# Patient Record
Sex: MALE | Race: WHITE | NOT HISPANIC OR LATINO | Employment: OTHER | URBAN - METROPOLITAN AREA
[De-identification: names, ages, dates, MRNs, and addresses within clinical notes are randomized per-mention and may not be internally consistent; named-entity substitution may affect disease eponyms.]

---

## 2017-08-23 ENCOUNTER — OFFICE VISIT (OUTPATIENT)
Dept: CARDIOLOGY | Facility: CLINIC | Age: 62
End: 2017-08-23

## 2017-08-23 VITALS
SYSTOLIC BLOOD PRESSURE: 110 MMHG | BODY MASS INDEX: 21.19 KG/M2 | DIASTOLIC BLOOD PRESSURE: 74 MMHG | HEART RATE: 88 BPM | WEIGHT: 148 LBS | HEIGHT: 70 IN

## 2017-08-23 DIAGNOSIS — E78.2 MIXED HYPERLIPIDEMIA: ICD-10-CM

## 2017-08-23 DIAGNOSIS — I48.0 PAROXYSMAL ATRIAL FIBRILLATION (HCC): Primary | ICD-10-CM

## 2017-08-23 DIAGNOSIS — I10 ESSENTIAL HYPERTENSION: ICD-10-CM

## 2017-08-23 PROCEDURE — 99214 OFFICE O/P EST MOD 30 MIN: CPT | Performed by: INTERNAL MEDICINE

## 2017-08-23 RX ORDER — LOSARTAN POTASSIUM 100 MG/1
100 TABLET ORAL DAILY
COMMUNITY
End: 2023-02-22

## 2017-08-23 RX ORDER — ATORVASTATIN CALCIUM 80 MG/1
80 TABLET, FILM COATED ORAL DAILY
COMMUNITY
End: 2017-08-23

## 2017-08-23 RX ORDER — RANITIDINE 300 MG/1
300 CAPSULE ORAL 2 TIMES DAILY
COMMUNITY
End: 2023-02-22

## 2017-08-23 RX ORDER — ATORVASTATIN CALCIUM 20 MG/1
20 TABLET, FILM COATED ORAL NIGHTLY
COMMUNITY
End: 2023-02-22

## 2017-08-23 RX ORDER — RANITIDINE 150 MG/1
150 TABLET ORAL 2 TIMES DAILY
COMMUNITY
End: 2017-08-23

## 2017-08-23 RX ORDER — ERGOCALCIFEROL 1.25 MG/1
50000 CAPSULE ORAL WEEKLY
COMMUNITY

## 2017-08-23 RX ORDER — HYDROXYZINE PAMOATE 50 MG/1
50 CAPSULE ORAL NIGHTLY
COMMUNITY
End: 2023-02-22

## 2017-08-23 NOTE — PROGRESS NOTES
"[facility]  Office Progress Note  Blanco Alicea  1955  [phone]      Visit Date: 08/23/2017    PCP: Memo Grimm, APRN  [address] [address]    IDENTIFICATION: A 62 y.o. male retired     Chief Complaint   Patient presents with   • Atrial Fibrillation       PROBLEM LIST:   1. New-onset atrial fibrillation 2015  11/15 echo mild + LVH EF 60%  2. Hypertension  3. Hyperlipidemia  1. 8/16   4. Seasonal allergies  5. GERD  6. R ankle pain-brace 2017    Allergies  Allergies   Allergen Reactions   • Penicillins        Current Medications    Current Outpatient Prescriptions:   •  apixaban (ELIQUIS) 5 MG tablet tablet, Take 5 mg by mouth Every 12 (Twelve) Hours., Disp: , Rfl:   •  atorvastatin (LIPITOR) 20 MG tablet, Take 20 mg by mouth Every Night., Disp: , Rfl:   •  carvedilol (COREG) 6.25 MG tablet, Take 6.25 mg by mouth 2 (Two) Times a Day., Disp: , Rfl:   •  hydrOXYzine (VISTARIL) 50 MG capsule, Take 50 mg by mouth Every Night., Disp: , Rfl:   •  losartan (COZAAR) 100 MG tablet, Take 100 mg by mouth Daily., Disp: , Rfl:   •  ranitidine (ZANTAC) 300 MG capsule, Take 300 mg by mouth 2 (Two) Times a Day., Disp: , Rfl:   •  vitamin D (ERGOCALCIFEROL) 02991 units capsule capsule, Take 50,000 Units by mouth 1 (One) Time Per Week., Disp: , Rfl:       History of Present Illness     Pt denies any chest pain, dyspnea, dyspnea on exertion, orthopnea, PND, palpitations, lower extremity edema, or claudication.  Notes daytime drowsiness and \"moaning\" at night while asleep.  Only  2 episodes of palpitations since last visit.    ROS:  All systems have been reviewed and are negative with the exception of those mentioned in the HPI.    OBJECTIVE:  Vitals:    08/23/17 1139   BP: 110/74   BP Location: Right arm   Patient Position: Sitting   Pulse: 88   Weight: 148 lb (67.1 kg)   Height: 70\" (177.8 cm)     Physical Exam   Constitutional: He appears " well-developed and well-nourished.   Neck: Normal range of motion. Neck supple. No hepatojugular reflux and no JVD present. Carotid bruit is not present. No tracheal deviation present. No thyromegaly present.   Cardiovascular: Normal rate, regular rhythm, S1 normal, S2 normal, intact distal pulses and normal pulses.  PMI is not displaced.  Exam reveals no gallop, no distant heart sounds, no friction rub, no midsystolic click and no opening snap.    No murmur heard.  Pulses:       Radial pulses are 2+ on the right side, and 2+ on the left side.        Dorsalis pedis pulses are 2+ on the right side, and 2+ on the left side.        Posterior tibial pulses are 2+ on the right side, and 2+ on the left side.   Pulmonary/Chest: Effort normal and breath sounds normal. He has no wheezes. He has no rales.   Abdominal: Soft. Bowel sounds are normal. He exhibits no mass. There is no tenderness. There is no guarding.   Musculoskeletal:   R ankle w pitting edema       Diagnostic Data:  Procedures      ASSESSMENT:   Diagnosis Plan   1. Paroxysmal atrial fibrillation     2. Essential hypertension     3. Mixed hyperlipidemia         PLAN:  1. On Eliquis, coreg , will screen w nocturnal O2 sat  2. Well controlled today, continue antihypertensives  3. Labs per PCP, continue statin therapy with atorvastatin     BENITO Tejada, thank you for referring Mr. Alicea for evaluation.  I have forwarded my electronically generated recommendations to you for review.  Please do not hesitate to call with any questions.    Scribed for Grady Hamilton MD by Zohreh Ng PA-C. 8/23/2017  11:54 AM  I, Grady Hamilton MD, personally performed the services described in this documentation as scribed by the above named individual in my presence, and it is both accurate and complete.  8/23/2017  12:48 PM    Grady Hamilton MD, [facility]

## 2017-09-05 RX ORDER — APIXABAN 5 MG/1
TABLET, FILM COATED ORAL
Qty: 30 TABLET | Refills: 11 | Status: SHIPPED | OUTPATIENT
Start: 2017-09-05 | End: 2018-03-09 | Stop reason: SDUPTHER

## 2020-06-02 RX ORDER — APIXABAN 5 MG/1
TABLET, FILM COATED ORAL
Qty: 180 TABLET | Refills: 3 | OUTPATIENT
Start: 2020-06-02

## 2020-11-19 ENCOUNTER — CONSULT (OUTPATIENT)
Dept: CARDIOLOGY | Facility: CLINIC | Age: 65
End: 2020-11-19

## 2020-11-19 VITALS
HEIGHT: 70 IN | BODY MASS INDEX: 22.33 KG/M2 | DIASTOLIC BLOOD PRESSURE: 80 MMHG | WEIGHT: 156 LBS | HEART RATE: 93 BPM | TEMPERATURE: 98.2 F | SYSTOLIC BLOOD PRESSURE: 130 MMHG

## 2020-11-19 DIAGNOSIS — I48.0 PAROXYSMAL ATRIAL FIBRILLATION (HCC): Primary | ICD-10-CM

## 2020-11-19 DIAGNOSIS — E78.2 MIXED HYPERLIPIDEMIA: ICD-10-CM

## 2020-11-19 DIAGNOSIS — I10 ESSENTIAL HYPERTENSION: ICD-10-CM

## 2020-11-19 PROCEDURE — 93000 ELECTROCARDIOGRAM COMPLETE: CPT | Performed by: INTERNAL MEDICINE

## 2020-11-19 PROCEDURE — 99204 OFFICE O/P NEW MOD 45 MIN: CPT | Performed by: INTERNAL MEDICINE

## 2020-11-19 RX ORDER — ROSUVASTATIN CALCIUM 20 MG/1
20 TABLET, COATED ORAL NIGHTLY
COMMUNITY
Start: 2020-10-04 | End: 2023-02-22

## 2020-11-19 NOTE — PROGRESS NOTES
[facility]  Consultation H&P  Blanco Alicea  1955  [address]  [facility] [address]     VISIT DATE:  11/19/20    PCP: Memo Grimm, APRN  [address]  [facility] [address]    IDENTIFICATION: A 65 y.o.  male  retired       PROBLEM LIST:   1. PAF  1. [id number] 2, on Eliquis  2. Onset 2015  3. 11/15 echo mild + LVH EF 60%  2. Hypertension  3. Hyperlipidemia, on atorvastatin   1. 8/16   2. 11/16/2020   HDL 69 LDL 98  4. Renal insufficiency  1. 11/2020 Cr 1.6, reports new nsaid use  5. Seasonal allergies  6. GERD  7. R ankle pain-brace 2017  8. Surgical history: Tonsillectomy, ankle fusion 2018    CC:  Chief Complaint   Patient presents with   • Atrial Fibrillation       Allergies  Allergies   Allergen Reactions   • Penicillins        Current Medications    Current Outpatient Medications:   •  apixaban (Eliquis) 5 MG tablet tablet, Take 1 tablet by mouth Every 12 (Twelve) Hours., Disp: 180 tablet, Rfl: 3  •  carvedilol (COREG) 6.25 MG tablet, Take 6.25 mg by mouth 2 (Two) Times a Day., Disp: , Rfl:   •  atorvastatin (LIPITOR) 20 MG tablet, Take 20 mg by mouth Every Night., Disp: , Rfl:   •  esomeprazole (nexIUM) 20 MG capsule, , Disp: , Rfl:   •  hydrOXYzine (VISTARIL) 50 MG capsule, Take 50 mg by mouth Every Night., Disp: , Rfl:   •  losartan (COZAAR) 100 MG tablet, Take 100 mg by mouth Daily., Disp: , Rfl:   •  ranitidine (ZANTAC) 300 MG capsule, Take 300 mg by mouth 2 (Two) Times a Day., Disp: , Rfl:   •  rosuvastatin (CRESTOR) 20 MG tablet, Take 20 mg by mouth Every Night., Disp: , Rfl:   •  vitamin D (ERGOCALCIFEROL) 97972 units capsule capsule, Take 50,000 Units by mouth 1 (One) Time Per Week., Disp: , Rfl:      History of Present Illness   HPI  Blanco Alicea is a 65 y.o. year old male with the above mentioned PMH who presents for follow up after a 3 year hiatus from our practice.      Pt denies any chest pain, dyspnea at  "rest, dyspnea on exertion, orthopnea, PND, palpitations, lower extremity edema, or claudication. Minimal exercise, has back trouble, long time rupture disc, and has now developed sciatica, being evaled by [facility] next month. Was historically symptomatic w afib, has not had symptoms in over a year.  Pt denies history of CHF, DVT, PE, MI, CVA, TIA, or rheumatic fever. Does note he has been taking advil. On statin. BP controlled today, though he does not check at home.    ROS  Review of Systems   HENT: Positive for hearing loss.    Respiratory: Positive for snoring.    All other systems reviewed and are negative.      SOCIAL HX  Social History     Socioeconomic History   • Marital status:      Spouse name: Not on file   • Number of children: Not on file   • Years of education: Not on file   • Highest education level: Not on file   Tobacco Use   • Smoking status: Former Smoker   • Smokeless tobacco: Never Used   Substance and Sexual Activity   • Alcohol use: Yes     Comment: rare   • Drug use: No       FAMILY HX  Family History   Problem Relation Age of Onset   • Stroke Mother    • Stroke Father        Vitals:    11/19/20 1256   BP: 130/80   BP Location: Right arm   Patient Position: Sitting   Pulse: 93   Temp: 98.2 °F (36.8 °C)   Weight: 70.8 kg (156 lb)   Height: 177.8 cm (70\")       PHYSICAL EXAMINATION:  Vitals signs and nursing note reviewed.   Constitutional:       General: Not in acute distress.     Appearance: Well-developed and not in distress.   Eyes:      Conjunctiva/sclera: Conjunctivae normal.   HENT:      Head: Normocephalic and atraumatic.      Right Ear: External ear normal.      Left Ear: External ear normal.      Nose: Nose normal.   Neck:      Musculoskeletal: Neck supple.      Thyroid: No thyromegaly.      Vascular: No carotid bruit or JVD.   Pulmonary:      Effort: Pulmonary effort is normal. No respiratory distress.      Breath sounds: Normal breath sounds. No decreased breath sounds. No " wheezing. No rhonchi. No rales.   Cardiovascular:      Normal rate. Regular rhythm. Normal S1. Normal S2.      Murmurs: There is no murmur.      No gallop. No click. No rub.   Pulses:     No decreased pulses.   Abdominal:      General: Bowel sounds are normal.      Palpations: Abdomen is soft.      Tenderness: There is no abdominal tenderness.   Musculoskeletal: Normal range of motion.   Skin:     General: Skin is warm and dry.      Findings: No erythema.   Neurological:      Mental Status: Alert and oriented to person, place, and time.      Comments: No focal deficits.   Psychiatric:         Thought Content: Thought content normal.         Diagnostic Data:    ECG 12 Lead    Date/Time: 11/19/2020 1:01 PM  Performed by: Grady Hamilton MD  Authorized by: Grady Hamilton MD   Comparison: not compared with previous ECG   Previous ECG: no previous ECG available  Rhythm: sinus rhythm  BPM: 93            No results found for: CHLPL, TRIG, HDL, LDLDIRECT  No results found for: GLUCOSE, BUN, CREATININE, NA, K, CL, CO2, CREATININE, BUN  No results found for: HGBA1C  No results found for: WBC, HGB, HCT, PLT    ASSESSMENT:   Diagnosis Plan   1. Paroxysmal atrial fibrillation (CMS/HCC)  ECG 12 Lead    apixaban (Eliquis) 5 MG tablet tablet   2. Essential hypertension     3. Mixed hyperlipidemia         PLAN:  1. NSR today.  Continue Coreg, Eliquis.  Eliquis [facility] sent [facility] [place]  2. Patient has acceptable BP. Continue current medical management. Counseled to regularly check BP at home with goal averaging <130/80.   3. Continue statin therapy.   4. Noted patient's creatinine 1.6, has not had labs reviewed with PCP yet.  Counseled patient to wean off Advil and try taking turmeric for back pain/inflammation.,  Counseled he may also take Tylenol.  Avoid NSAIDs for renal/cardiovascular risk.        BENITO Tejada, thank you for referring Mr. Alicea for evaluation.  I have forwarded my electronically generated  recommendations to you for review.  Please do not hesitate to call with any questions.    Scribed for Grady Hamilton MD by Zohreh Ng PA-C. 11/19/2020  13:26 EST   Grady Hamilton MD, FACC

## 2021-05-11 ENCOUNTER — TELEPHONE (OUTPATIENT)
Dept: CARDIOLOGY | Facility: CLINIC | Age: 66
End: 2021-05-11

## 2021-05-11 NOTE — TELEPHONE ENCOUNTER
Pt is scheduled to have ankle replacement surgery with  at [facility]. Pt last seen on 11/19/20. Please advise and a letter will be sent

## 2023-02-06 ENCOUNTER — TELEPHONE (OUTPATIENT)
Dept: CARDIOLOGY | Facility: CLINIC | Age: 68
End: 2023-02-06
Payer: MEDICARE

## 2023-02-22 ENCOUNTER — OFFICE VISIT (OUTPATIENT)
Dept: CARDIOLOGY | Facility: CLINIC | Age: 68
End: 2023-02-22
Payer: MEDICARE

## 2023-02-22 VITALS
DIASTOLIC BLOOD PRESSURE: 62 MMHG | WEIGHT: 160 LBS | HEART RATE: 108 BPM | SYSTOLIC BLOOD PRESSURE: 118 MMHG | OXYGEN SATURATION: 98 % | HEIGHT: 70 IN | BODY MASS INDEX: 22.9 KG/M2

## 2023-02-22 DIAGNOSIS — I48.0 PAROXYSMAL ATRIAL FIBRILLATION: ICD-10-CM

## 2023-02-22 PROCEDURE — 93000 ELECTROCARDIOGRAM COMPLETE: CPT | Performed by: INTERNAL MEDICINE

## 2023-02-22 PROCEDURE — 99213 OFFICE O/P EST LOW 20 MIN: CPT | Performed by: INTERNAL MEDICINE

## 2023-02-22 RX ORDER — CARVEDILOL 6.25 MG/1
6.25 TABLET ORAL 2 TIMES DAILY
Qty: 60 TABLET | Refills: 0 | Status: SHIPPED | OUTPATIENT
Start: 2023-02-22 | End: 2023-03-20 | Stop reason: SDUPTHER

## 2023-02-22 RX ORDER — ROSUVASTATIN CALCIUM 20 MG/1
20 TABLET, COATED ORAL NIGHTLY
Qty: 90 TABLET | Refills: 3 | Status: SHIPPED | OUTPATIENT
Start: 2023-02-22

## 2023-02-22 RX ORDER — LOSARTAN POTASSIUM 100 MG/1
100 TABLET ORAL DAILY
Qty: 90 TABLET | Refills: 3 | Status: SHIPPED | OUTPATIENT
Start: 2023-02-22

## 2023-02-22 NOTE — PROGRESS NOTES
"[facility]  Consultation H&P  Blanco Alicea  1955  [address]  [facility] [address]     VISIT DATE:  02/22/23    PCP: Memo Grimm, APRN  [address]  [facility] [address]    IDENTIFICATION: A 67 y.o.  male  retired       PROBLEM LIST:   1. PAF  1. [id number] 2, on Eliquis  2. Onset 2015  3. 11/15 echo mild + LVH EF 60%  2. Hypertension  3. Hyperlipidemia, on atorvastatin   1. 8/16   2. 11/16/2020   HDL 69 LDL 98  4. Renal insufficiency  1. 11/2020 Cr 1.6, reports new nsaid use  5. Seasonal allergies  6. GERD  7. ankle pain-brace 2017  8. Surgical history: Tonsillectomy, L ankle fusion X3  [id number]    CC:  Chief Complaint   Patient presents with   • Re_Eval     New Patient       Allergies  Allergies   Allergen Reactions   • Penicillins Other (See Comments)     Childhood         Current Medications    Current Outpatient Medications:   •  apixaban (Eliquis) 5 MG tablet tablet, Take 1 tablet by mouth Every 12 (Twelve) Hours., Disp: 180 tablet, Rfl: 3  •  carvedilol (COREG) 6.25 MG tablet, Take 1 tablet by mouth 2 (Two) Times a Day., Disp: 60 tablet, Rfl: 0  •  esomeprazole (nexIUM) 20 MG capsule, 20 mg Daily., Disp: , Rfl:   •  losartan (COZAAR) 100 MG tablet, Take 1 tablet by mouth Daily., Disp: 90 tablet, Rfl: 3  •  rosuvastatin (CRESTOR) 20 MG tablet, Take 1 tablet by mouth Every Night., Disp: 90 tablet, Rfl: 3  •  vitamin D (ERGOCALCIFEROL) 77934 units capsule capsule, Take 50,000 Units by mouth 1 (One) Time Per Week., Disp: , Rfl:      History of Present Illness   HPI  Blanco Alicea is a 67 y.o. year old male with the above mentioned PMH who presents for follow up after a 3 year hiatus from our practice(again).    He has had 3 ankle surgeries since last visit walking with a cane and is less physically active.  He has been out of his cardiac medications for \"some time\"    Vitals:    02/22/23 0856   BP: 118/62   BP " "Location: Right arm   Patient Position: Sitting   Pulse: 108   SpO2: 98%   Weight: 72.6 kg (160 lb)   Height: 177.8 cm (70\")       PHYSICAL EXAMINATION:  Vitals and nursing note reviewed.   Constitutional:       General: Not in acute distress.     Appearance: Well-developed and not in distress.   Eyes:      Conjunctiva/sclera: Conjunctivae normal.   HENT:      Head: Normocephalic and atraumatic.      Right Ear: External ear normal.      Left Ear: External ear normal.      Nose: Nose normal.   Neck:      Thyroid: No thyromegaly.      Vascular: No carotid bruit or JVD.   Pulmonary:      Effort: Pulmonary effort is normal. No respiratory distress.      Breath sounds: Normal breath sounds. No decreased breath sounds. No wheezing. No rhonchi. No rales.   Cardiovascular:      Tachycardia present. Regular rhythm. Normal S1. Normal S2.      Murmurs: There is no murmur.      No gallop. No click. No rub.   Pulses:     No decreased pulses.   Abdominal:      General: Bowel sounds are normal.      Palpations: Abdomen is soft.      Tenderness: There is no abdominal tenderness.   Musculoskeletal: Normal range of motion.      Cervical back: Neck supple. Skin:     General: Skin is warm and dry.      Findings: No erythema.   Neurological:      Mental Status: Alert and oriented to person, place, and time.      Comments: No focal deficits.   Psychiatric:         Thought Content: Thought content normal.         Diagnostic Data:    ECG 12 Lead    Date/Time: 2/22/2023 9:18 AM  Performed by: Grady Hamilton MD  Authorized by: Grady Hamilton MD   Comparison: compared with previous ECG from 11/19/2020  Similar to previous ECG  Rhythm: sinus tachycardia  BPM: 105    Clinical impression: non-specific ECG            ASSESSMENT:   Diagnosis Plan   1. Paroxysmal atrial fibrillation (HCC)  apixaban (Eliquis) 5 MG tablet tablet          PLAN:  1. PAF.  Continue Coreg, Eliquis.  Eliquis prescription sent to [facility].  I reiterated need to " be compliant with medications and probability that he ever have recurrent atrial fibrillation if he abruptly stops beta-blockade.  2. Patient has acceptable BP. Continue current medical management. Counseled to regularly check BP at home with goal averaging <130/80.   3. Continue statin therapy.         No ref. provider found, thank you for referring Mr. Alicea for evaluation.  I have forwarded my electronically generated recommendations to you for review.  Please do not hesitate to call with any questions.     Grady Hamilton MD  2/22/2023  09:18 EST   Grady Hamilton MD, [facility]

## 2023-03-20 RX ORDER — CARVEDILOL 6.25 MG/1
6.25 TABLET ORAL 2 TIMES DAILY
Qty: 180 TABLET | Refills: 3 | Status: SHIPPED | OUTPATIENT
Start: 2023-04-20

## 2023-03-20 RX ORDER — CARVEDILOL 6.25 MG/1
6.25 TABLET ORAL 2 TIMES DAILY
Qty: 60 TABLET | Refills: 0 | Status: SHIPPED | OUTPATIENT
Start: 2023-03-20 | End: 2023-03-20 | Stop reason: SDUPTHER

## 2023-05-01 RX ORDER — CARVEDILOL 6.25 MG/1
TABLET ORAL
Qty: 60 TABLET | OUTPATIENT
Start: 2023-05-01

## 2023-05-01 NOTE — TELEPHONE ENCOUNTER
Called to the room by pt rt bug crawling up his arm. Believed to be bed bugs as spotted by x ray previously. Specimen collected & provided pt wipe to clean his hands per request.  Pt would now like to leave AMA. Told him that I would discuss that with the admitting doctor. Hospitalist messaged; awaiting response.        Catherine Fuentes RN  09/16/20 6140 Sent to mail order 3/2023

## 2023-12-12 ENCOUNTER — TELEPHONE (OUTPATIENT)
Dept: CARDIOLOGY | Facility: CLINIC | Age: 68
End: 2023-12-12
Payer: MEDICARE

## 2023-12-12 NOTE — TELEPHONE ENCOUNTER
Caller: Blanco Alicea    Relationship to patient: Self    Best call back number: [phone]    Chief complaint: AFBIB    Type of visit: FU    Requested date: PT WILL CAN BE SCHEDULED AT ANY LOCATION

## 2024-01-10 ENCOUNTER — OFFICE VISIT (OUTPATIENT)
Dept: CARDIOLOGY | Facility: CLINIC | Age: 69
End: 2024-01-10
Payer: MEDICARE

## 2024-01-10 VITALS
BODY MASS INDEX: 23.48 KG/M2 | HEART RATE: 83 BPM | WEIGHT: 164 LBS | OXYGEN SATURATION: 97 % | DIASTOLIC BLOOD PRESSURE: 72 MMHG | SYSTOLIC BLOOD PRESSURE: 126 MMHG | HEIGHT: 70 IN

## 2024-01-10 DIAGNOSIS — E78.2 MIXED HYPERLIPIDEMIA: ICD-10-CM

## 2024-01-10 DIAGNOSIS — I48.0 PAROXYSMAL ATRIAL FIBRILLATION: Primary | ICD-10-CM

## 2024-01-10 DIAGNOSIS — I10 PRIMARY HYPERTENSION: ICD-10-CM

## 2024-01-10 PROCEDURE — 3078F DIAST BP <80 MM HG: CPT | Performed by: INTERNAL MEDICINE

## 2024-01-10 PROCEDURE — 93000 ELECTROCARDIOGRAM COMPLETE: CPT | Performed by: INTERNAL MEDICINE

## 2024-01-10 PROCEDURE — 99214 OFFICE O/P EST MOD 30 MIN: CPT | Performed by: INTERNAL MEDICINE

## 2024-01-10 PROCEDURE — 3074F SYST BP LT 130 MM HG: CPT | Performed by: INTERNAL MEDICINE

## 2024-01-10 RX ORDER — KETOCONAZOLE 20 MG/G
CREAM TOPICAL
COMMUNITY
Start: 2023-11-28

## 2024-01-10 NOTE — PROGRESS NOTES
[facility] Cardiology  Consultation H&P  Blanco Alicea  1955  [address]  [facility] [address]     VISIT DATE:  01/10/24    PCP: Memo Grimm, APRN  [address]  [facility] [address]    IDENTIFICATION: A 68 y.o.  male  retired       PROBLEM LIST:   PAF  [id number] 2, on Eliquis  Onset 2015  11/15 echo mild + LVH EF 60%  Hypertension  Hyperlipidemia, on atorvastatin   8/16   11/16/2020   HDL 69 LDL 98  Renal insufficiency  11/2020 Cr 1.6, reports new nsaid use  Seasonal allergies  GERD  ankle pain-brace 2017  Surgical history: Tonsillectomy, L ankle fusion X3  [id number]    CC:  Chief Complaint   Patient presents with    PAF       Allergies  Allergies   Allergen Reactions    Penicillins Other (See Comments)     Childhood         Current Medications    Current Outpatient Medications:     apixaban (Eliquis) 5 MG tablet tablet, Take 1 tablet by mouth Every 12 (Twelve) Hours., Disp: 180 tablet, Rfl: 3    carvedilol (COREG) 6.25 MG tablet, Take 1 tablet by mouth 2 (Two) Times a Day., Disp: 180 tablet, Rfl: 3    esomeprazole (nexIUM) 20 MG capsule, 1 capsule Daily., Disp: , Rfl:     ketoconazole (NIZORAL) 2 % cream, APPLY 1 GRAM TOPICALLY TO THE AFFECTED AREA DAILY, Disp: , Rfl:     losartan (COZAAR) 100 MG tablet, Take 1 tablet by mouth Daily., Disp: 90 tablet, Rfl: 3    rosuvastatin (CRESTOR) 20 MG tablet, Take 1 tablet by mouth Every Night., Disp: 90 tablet, Rfl: 3    vitamin D (ERGOCALCIFEROL) 83128 units capsule capsule, Take 1 capsule by mouth 1 (One) Time Per Week., Disp: , Rfl:      History of Present Illness   HPI  Blanco Alicea is a 68 y.o. year old male with the above mentioned PMH who presents for follow up .    Accompanied by his wife, states he has had recurrence of what he thinks is atrial fibrillation according to his smart watch.  This can occur spontaneously every other day with no rhyme or reason.  He states it resolved  "spontaneously.  He is not exercising at all.  He does drink at least 2 glasses of wine nightly    Vitals:    01/10/24 0957   BP: 126/72   BP Location: Right arm   Patient Position: Sitting   Pulse: 83   SpO2: 97%   Weight: 74.4 kg (164 lb)   Height: 177.8 cm (70\")       PHYSICAL EXAMINATION:  Vitals and nursing note reviewed.   Constitutional:       General: Not in acute distress.     Appearance: Well-developed and not in distress.   Eyes:      Conjunctiva/sclera: Conjunctivae normal.   HENT:      Head: Normocephalic and atraumatic.      Right Ear: External ear normal.      Left Ear: External ear normal.      Nose: Nose normal.   Neck:      Thyroid: No thyromegaly.      Vascular: No carotid bruit or JVD.   Pulmonary:      Effort: Pulmonary effort is normal. No respiratory distress.      Breath sounds: Normal breath sounds. No decreased breath sounds. No wheezing. No rhonchi. No rales.   Cardiovascular:      Normal rate. Regular rhythm. Normal S1. Normal S2.       Murmurs: There is no murmur.      No gallop.  No click. No rub.   Pulses:     No decreased pulses.   Abdominal:      General: Bowel sounds are normal.      Palpations: Abdomen is soft.      Tenderness: There is no abdominal tenderness.   Musculoskeletal: Normal range of motion.      Cervical back: Neck supple. Skin:     General: Skin is warm and dry.      Findings: No erythema.   Neurological:      Mental Status: Alert and oriented to person, place, and time.      Comments: No focal deficits.   Psychiatric:         Thought Content: Thought content normal.         Diagnostic Data:    ECG 12 Lead    Date/Time: 1/10/2024 10:19 AM  Performed by: Grady Hamilton MD    Authorized by: Grady Hamilton MD  Comparison: compared with previous ECG from 2/22/2023  Similar to previous ECG  Rhythm: sinus rhythm  BPM: 77    Clinical impression: normal ECG          ASSESSMENT:   Diagnosis Plan   1. Paroxysmal atrial fibrillation        2. Primary hypertension        3. " Mixed hyperlipidemia              PLAN:  PAF.  Continue Coreg, Eliquis.  Eliquis prescription sent to [facility].  Will document 1 week E patch with potential transition of pharmacotherapy including antiarrhythmic  Patient has acceptable BP. Continue current medical management. Counseled to regularly check BP at home with goal averaging <130/80.   Controlled on statin therapy.         No ref. provider found, thank you for referring Mr. Alicea for evaluation.  I have forwarded my electronically generated recommendations to you for review.  Please do not hesitate to call with any questions.     Grady Hamilton MD  1/10/2024  10:20 EST   Grady Hamilton MD, FACC

## 2024-01-25 RX ORDER — LOSARTAN POTASSIUM 100 MG/1
100 TABLET ORAL DAILY
Qty: 90 TABLET | Refills: 3 | Status: SHIPPED | OUTPATIENT
Start: 2024-01-25

## 2024-02-09 ENCOUNTER — TELEPHONE (OUTPATIENT)
Dept: CARDIOLOGY | Facility: CLINIC | Age: 69
End: 2024-02-09
Payer: MEDICARE

## 2024-02-09 NOTE — TELEPHONE ENCOUNTER
Patient notified and verbalized understanding. Current BP is 78/61 and his is hydrating. Advised when to proceed to the ER.

## 2024-02-09 NOTE — TELEPHONE ENCOUNTER
"Patient states that he has been in afib yesterday and today. He wears a watch that shows he is out of rhythm. Current vitals: BP 65/45, HR 72    He has doubled his carvedilol yesterday and this morning. He normally takes carvedilol 6.25mg BID. The patient also has losartan 100 nightly for BP. He takes eliquis 5mg BID daily.    Denies CP, SOA, edema. States he is \"a little dizzy and light-headed\". The patient drinks one cup of coffee daily, one glass of wine, and makes sure to drink fluids. He has no sleep issues and does not smoke. I advised of potential triggers and he will not have any caffeine or alcohol today.    I advised the drink a sports drink and eat some salty food to help get his BP up. He will be careful moving from sitting to standing. The lower BP rates could be due to the increase in carvedilol over the last two days as well as being out of rhythm. I will speak with the provider for further recs. The patient was informed when they should proceed to the ED.  "

## 2024-02-09 NOTE — TELEPHONE ENCOUNTER
Caller: Sissy Alicea    Relationship: Emergency Contact    Best call back number: [phone]    What is the best time to reach you: ANY    Who are you requesting to speak with (clinical staff, provider,  specific staff member): ANY      What was the call regarding: PT WAS IN A FIB YESTERDAY AND TODAY. WOULD LIKE TO SPEAK TO RN OR MA    Is it okay if the provider responds through MyChart: NO

## 2024-02-09 NOTE — TELEPHONE ENCOUNTER
Ok to increase carvedilol to 12.5 mg BID when BP recovers, reduce losartan to 50 mg daily. Hold losartan for SBP < 110. Hydrate with non-caffeinated beverages, change positions slowly.     Come in Monday for EKG if remains out of rhythm. NO afib on recent monitor.    If BP does not rebound or he becomes symptomatic - go to ER  Avoid alcohol - double his chances of recurrent afib

## 2024-02-12 RX ORDER — ROSUVASTATIN CALCIUM 20 MG/1
20 TABLET, COATED ORAL NIGHTLY
Qty: 90 TABLET | Refills: 1 | Status: SHIPPED | OUTPATIENT
Start: 2024-02-12

## 2024-02-20 ENCOUNTER — TELEPHONE (OUTPATIENT)
Dept: CARDIOLOGY | Facility: CLINIC | Age: 69
End: 2024-02-20
Payer: MEDICARE

## 2024-02-20 DIAGNOSIS — I48.0 PAROXYSMAL ATRIAL FIBRILLATION: Primary | ICD-10-CM

## 2024-02-20 RX ORDER — CARVEDILOL 6.25 MG/1
6.25 TABLET ORAL 2 TIMES DAILY
Qty: 180 TABLET | Refills: 3 | Status: SHIPPED | OUTPATIENT
Start: 2024-02-20

## 2024-02-20 NOTE — TELEPHONE ENCOUNTER
Caller: LEO BOLDEN    Relationship: PATIENT    Best call back number: [phone]    What is the medical concern/diagnosis: A-FIB    What specialty or service is being requested: ABLATION    What is the provider, practice or medical service name: DR FITZGERALD        Any additional details: PATIENT WOULD LIKE A REFERRAL TO SEE DR FITZGERALD TO INQUIRE ABOUT AN ABLATION.

## 2024-02-21 NOTE — TELEPHONE ENCOUNTER
Patient is requesting an EP referral to discuss an ablation.     He was last seen on 1/10/24:            Holter on 1/10/24:      Please advise.

## 2024-03-26 NOTE — PROGRESS NOTES
Electrophysiology Clinic Consult     Blanco Alicea  1955  [unfilled]  [unfilled]    03/27/24    DATE OF ADMISSION: (Not on file)  [facility] CARDIOLOGY    Memo Grimm, APRN  [address] / [facility] [address]  Referring Provider: Grady Hamilton MD     Chief Complaint   Patient presents with    Paroxysmal atrial fibrillation     Problem List:  Paroxysmal Atrial Fibrillation   CHADSVasc = 2 on Eliquis  Onset 2015, EKG shows atrial fibrillation  Echocardiogram 11/2015: mild LVH, EF 60%  Event Monitor 1/2024: Predominant NSR, average HR 76 bpm. No AFIB, less than 1% PAC/PVC  HTN  HLP  Renal Insufficiency Cr 1.63   Seasonal Allergies  GERD  Surgical History:  Tonsillectomy  L ankle fusion x 3       History of Present Illness:   Blanco Alicea is a 69 year old male with above history who presents today for consultation regarding atrial fibrillation. He is referred by Dr. Hamilton. He was diagnosed with atrial fibrillation in 2015. He has been paroxysmal and never required ECV. He has been on Coreg and Eliquis since that time. Recently, he has had more frequent episodes, occurring once per week, lasting 2 hours, with symptoms lightheadedness, dizziness, fatigue, moderate in nature. No CP or Syncope. No triggering factors. He wore a monitor 1/2024 but did not have any episodes, and of course, no atrial fibrillation was noted. He has noted episodes of atrial fibrillation on his apple watch several times. He is interested in ablation. HTN is well controlled.   FREDDY: never been tested, + snoring   TSH: no thyroid issues   Tobacco: none  ETOH: moderate, 2 glasses of wine per night  Caffeine: 1 cup of coffee in AM  Stimulants: none       Allergies   Allergen Reactions    Penicillins Other (See Comments)     Childhood          Cannot display prior to admission medications because the patient has not been admitted in this contact.              Current Outpatient Medications:     apixaban  (Eliquis) 5 MG tablet tablet, Take 1 tablet by mouth Every 12 (Twelve) Hours., Disp: 180 tablet, Rfl: 3    carvedilol (COREG) 6.25 MG tablet, TAKE 1 TABLET TWICE A DAY, Disp: 180 tablet, Rfl: 3    esomeprazole (nexIUM) 20 MG capsule, 1 capsule Daily., Disp: , Rfl:     ketoconazole (NIZORAL) 2 % cream, APPLY 1 GRAM TOPICALLY TO THE AFFECTED AREA DAILY, Disp: , Rfl:     rosuvastatin (CRESTOR) 20 MG tablet, Take 1 tablet by mouth Every Night., Disp: 90 tablet, Rfl: 1    vitamin D (ERGOCALCIFEROL) 10419 units capsule capsule, Take 1 capsule by mouth 1 (One) Time Per Week., Disp: , Rfl:     Social History     Socioeconomic History    Marital status:    Tobacco Use    Smoking status: Former     Types: Cigarettes     Passive exposure: Past    Smokeless tobacco: Never    Tobacco comments:     Smoked over 50 years since quit   Vaping Use    Vaping status: Never Used   Substance and Sexual Activity    Alcohol use: Yes     Comment: rare    Drug use: No    Sexual activity: Not Currently     Partners: Female       Family History   Problem Relation Age of Onset    Stroke Mother     Stroke Father     Heart attack Father        REVIEW OF SYSTEMS:   CONST:  No weight loss, fever, chills, weakness or fatigue.   HEENT:  No visual loss, blurred vision, double vision, yellow sclerae.                   No hearing loss, congestion, sore throat.   SKIN:      No rashes, urticaria, ulcers, sores.     RESP:     No shortness of breath, hemoptysis, cough, sputum.   GI:           No anorexia, nausea, vomiting, diarrhea. No abdominal pain, melena.   :         No burning on urination, hematuria or increased frequency.  ENDO:    No diaphoresis, cold or heat intolerance. No polyuria or polydipsia.   NEURO:  No headache, dizziness, syncope, paralysis, ataxia, or parasthesias.                  No change in bowel or bladder control. No history of CVA/TIA  MUSC:    No muscle, back pain, joint pain or stiffness.   HEME:    No anemia, bleeding,  "bruising. No history of DVT/PE.  PSYCH:  No history of depression, anxiety    Vitals:    03/27/24 1402   BP: 110/60   BP Location: Left arm   Patient Position: Sitting   Cuff Size: Adult   Pulse: 78   SpO2: 98%   Weight: 75.6 kg (166 lb 9.6 oz)   Height: 177.8 cm (70\")                 Physical Exam:  GEN: Well nourished, well-developed, no acute distress  HEENT: Normocephalic, atraumatic, PERRLA, moist mucous membranes  NECK: Supple, NO JVD, no thyromegaly, no lymphadenopathy  CARD: Regular rate rhythm normal S1-S2.  There is an S4.  No murmurs are appreciated.  PMI normal  LUNGS: Clear to auscultation, normal respiratory effort  ABDOMEN: Soft, nontender, normal bowel sounds  EXTREMITIES: No gross deformities, no clubbing, cyanosis, or edema  SKIN: Warm, dry  NEURO: No focal deficits, alert and oriented x 3  PSYCHIATRIC: Normal affect and mood      I personally viewed and interpreted the patient's EKG/Telemetry/lab data    No results found for: \"GLUCOSE\", \"CALCIUM\", \"NA\", \"K\", \"CO2\", \"CL\", \"BUN\", \"CREATININE\", \"EGFRIFAFRI\", \"EGFRIFNONA\", \"BCR\", \"ANIONGAP\"  No results found for: \"WBC\", \"HGB\", \"HCT\", \"MCV\", \"PLT\"  No results found for: \"INR\", \"PROTIME\"  No results found for: \"TSH\", \"T2UBRBT\", \"E5NBBGI\", \"THYROIDAB\"          ECG 12 Lead    Date/Time: 3/27/2024 2:27 PM  Performed by: Eamon Morfin MD    Authorized by: Eamon Morfin MD  Comparison: compared with previous ECG from 1/10/2024  Similar to previous ECG  Rhythm: sinus rhythm  BPM: 78            ICD-10-CM ICD-9-CM   1. Paroxysmal atrial fibrillation  I48.0 427.31   2. Primary hypertension  I10 401.9       Assessment and Plan:     Paroxysmal Atrial Fibrillation:   - diagnosed 2015, treated with beta blockers, recently increased frequency, symptomatic in nature  - discussed options with the patient today including antiarrythmic medication versus ablation. He would be a good PFA candidate, and he is interested in this.  The risks, benefits, and " alternatives of the procedure have been reviewed and the patient wishes to proceed.   - CHADSvasc = 2 on Eliquis. Hold Eliquis morning of the procedure.   - will obtain echocardiogram to assess EF, LA size, and valve morphology     2. HTN:  - controlled on current medications, recently discontinued Losartan due to hypotension      Scribed for Eamon Morfin MD by Yu Ceron PA-C. 3/27/2024  14:45 EDT    I, Eamon Morfin MD, personally performed the services described in this documentation as scribed by the above named individual in my presence, and it is both accurate and complete.  3/27/2024  15:14 EDT

## 2024-03-27 ENCOUNTER — OFFICE VISIT (OUTPATIENT)
Dept: CARDIOLOGY | Facility: CLINIC | Age: 69
End: 2024-03-27
Payer: MEDICARE

## 2024-03-27 VITALS
HEART RATE: 78 BPM | HEIGHT: 70 IN | SYSTOLIC BLOOD PRESSURE: 110 MMHG | WEIGHT: 166.6 LBS | BODY MASS INDEX: 23.85 KG/M2 | OXYGEN SATURATION: 98 % | DIASTOLIC BLOOD PRESSURE: 60 MMHG

## 2024-03-27 DIAGNOSIS — I48.0 PAROXYSMAL ATRIAL FIBRILLATION: Primary | ICD-10-CM

## 2024-03-27 DIAGNOSIS — I10 PRIMARY HYPERTENSION: ICD-10-CM

## 2024-04-16 ENCOUNTER — TELEPHONE (OUTPATIENT)
Dept: CARDIOLOGY | Facility: CLINIC | Age: 69
End: 2024-04-16
Payer: MEDICARE

## 2024-04-16 NOTE — TELEPHONE ENCOUNTER
[facility] NEW LOC for appt on 01/22/25 with Crager at 1:45 pm -[address], [address], [place] [address]. Return call at [phone] with any questions.GABRIELLE 04/16/24

## 2024-05-09 ENCOUNTER — TELEPHONE (OUTPATIENT)
Dept: CARDIOLOGY | Facility: CLINIC | Age: 69
End: 2024-05-09
Payer: MEDICARE

## 2024-06-05 ENCOUNTER — TELEPHONE (OUTPATIENT)
Dept: CARDIOLOGY | Facility: CLINIC | Age: 69
End: 2024-06-05
Payer: MEDICARE

## 2024-06-05 NOTE — TELEPHONE ENCOUNTER
Followed up with pt , b/p 112/74 took his Caredilol noonish ,wants to continue trying this before doing any of the below suggestions. He says he has had 1 episode with his Apple Watch since February . States he will see PCP and get labs in the near future.  Pt advised to call before he goes and lab orders will be placed. Pt acknowledged that he tries hard to stay well hydrated. Pt verbalized understanding and will call back with a log of vitals if he doesn't begin to feel better. No further questions,concerns.         Any recent labs with PCP?  Last labs in epic from 2020  On Eliquis he may need CBC to check for anemia.   If no recent labs with PCP-check TSH with T4 if abnormal, CBC with differential, CMP, lipid, A1c.  Would not hold carvedilol with PAF-does he feel like he is out of rhythm?    I think he wears an Apple Watch, what are heart rates?  Shelbie note mentions checking echo prior to ablation-we can order this.  Recommend hydration and changing positions slowly

## 2024-06-05 NOTE — TELEPHONE ENCOUNTER
Any recent labs with PCP?  Last labs in epic from 2020  On Eliquis he may need CBC to check for anemia.   If no recent labs with PCP-check TSH with T4 if abnormal, CBC with differential, CMP, lipid, A1c.  Would not hold carvedilol with PAF-does he feel like he is out of rhythm?    I think he wears an Apple Watch, what are heart rates?  Shelbie note mentions checking echo prior to ablation-we can order this.  Recommend hydration and changing positions slowly

## 2024-06-05 NOTE — TELEPHONE ENCOUNTER
Pt called states that in the am his blood pressure has been running systolic  90's-100's diastolic 50's . He reports feeling lethargic in the morning , as the day goes on he feels much better. Pt typically takes Carvedilol 6.25 mg daily and had been taking this in the evenings. Pt has tried taking the Carvedilol one time so far to date   mid morning with similar results. Blood pressure per pt stabilizes throughout the day 110/70 , with pt feeling much better.  Advised pt to take later in the day and record results.Verbalized understanding.      Thoughts?

## 2024-06-25 DIAGNOSIS — E78.2 MIXED HYPERLIPIDEMIA: ICD-10-CM

## 2024-06-25 DIAGNOSIS — N28.9 RENAL INSUFFICIENCY: ICD-10-CM

## 2024-06-25 DIAGNOSIS — I10 PRIMARY HYPERTENSION: Primary | ICD-10-CM

## 2024-06-25 DIAGNOSIS — K21.9 GASTROESOPHAGEAL REFLUX DISEASE WITHOUT ESOPHAGITIS: ICD-10-CM

## 2024-06-25 DIAGNOSIS — I48.0 PAROXYSMAL ATRIAL FIBRILLATION: ICD-10-CM

## 2024-07-09 DIAGNOSIS — I48.0 PAROXYSMAL ATRIAL FIBRILLATION: ICD-10-CM

## 2024-07-10 RX ORDER — APIXABAN 5 MG/1
5 TABLET, FILM COATED ORAL EVERY 12 HOURS
Qty: 180 TABLET | Refills: 3 | Status: SHIPPED | OUTPATIENT
Start: 2024-07-10

## 2024-08-12 ENCOUNTER — PREP FOR SURGERY (OUTPATIENT)
Dept: OTHER | Facility: HOSPITAL | Age: 69
End: 2024-08-12
Payer: MEDICARE

## 2024-08-12 DIAGNOSIS — I48.0 PAROXYSMAL ATRIAL FIBRILLATION: Primary | ICD-10-CM

## 2024-08-12 RX ORDER — SODIUM CHLORIDE 0.9 % (FLUSH) 0.9 %
3 SYRINGE (ML) INJECTION EVERY 12 HOURS SCHEDULED
OUTPATIENT
Start: 2024-08-12

## 2024-08-12 RX ORDER — NITROGLYCERIN 0.4 MG/1
0.4 TABLET SUBLINGUAL
OUTPATIENT
Start: 2024-08-12

## 2024-08-12 RX ORDER — SODIUM CHLORIDE 0.9 % (FLUSH) 0.9 %
10 SYRINGE (ML) INJECTION AS NEEDED
OUTPATIENT
Start: 2024-08-12

## 2024-08-12 RX ORDER — SODIUM CHLORIDE 9 MG/ML
1 INJECTION, SOLUTION INTRAVENOUS CONTINUOUS
OUTPATIENT
Start: 2024-08-12 | End: 2024-08-12

## 2024-08-12 RX ORDER — ACETAMINOPHEN 325 MG/1
650 TABLET ORAL EVERY 4 HOURS PRN
OUTPATIENT
Start: 2024-08-12

## 2024-08-12 RX ORDER — SODIUM CHLORIDE 9 MG/ML
40 INJECTION, SOLUTION INTRAVENOUS AS NEEDED
OUTPATIENT
Start: 2024-08-12

## 2024-09-05 NOTE — NURSING NOTE
PRE-PVA ASSESSMENT  Blanco Alicea 1955   [address] [facility] [address] [address]   [phone]  There is no work phone number on file.      Referral Source: Dr. Grady Hamilton  Information obtained from: [x] Medical record review  [x] Patient report  Scheduled for: PVA w/ PFA on 09/26/24 with Dr. Morfin  Allergies   Allergen Reactions    Penicillins Other (See Comments)     Childhood         AFib Specific History:  AFIBTYPE: paroxysmal    CHADS-VASc Risk Assessment               2 Total Score    1 Hypertension    1 Age 65-74    Criteria that do not apply:    CHF    Age >/= 75    DM    PRIOR STROKE/TIA/THROMBO    Vascular Disease    Sex: Female            Anticoagulation: Eliquis 5 mg BID, NO missed doses.   Cardioversion x 0  Failed AAD(s): none.   Prior Ablation: 0    Is Mr. Alicea aware of his AFib? Yes   Onset: 2015    Exacerbations: none   Frequency: weekly, monthly   Alleviations: yoga, playing trombone, deep breathing     Duration: minutes, hours     Symptoms:   [] Palpitations:    [] Chest Discomfort:    [] Dizziness:    [] Presyncope:    [x] Lightheadedness:   [] Syncope:    [] Fatigue:    [] Other:    [] Short of Breath:     Last Echo(s):  [x] TTE Date: 11/2015   Echocardiogram 11/2015: mild LVH, EF 60%          EF: 60%             LA: data deficit          VHD? Data deficit        Past medical History:   [] Diabetes  NO                          [] HYPOthyroidism NO    [] HYPERthyroidism NO        TSH: 3.59 (06/2024)    [x] HTN        [x] Tx: carvedilol     [] Heart Failure NO   [] CVA  NO                             [] TIA NO     [] CAD    NO     [] MI  NO          [x] Dyslipidemia  [x] Statin indicated : Rosuvastatin 20 mg     [] Ischemic Evaluation       [] Stress Test: NO       [] Heart Cath: NO      [x] Sleep Apnea Suspected        [x] Discussed with Mr. Alicea         [x] Declined by Mr. Alicea     [] Obesity NO          Summary of Patient Contact:    I spoke with Mr. Alicea about his upcoming PVA.   He  was well informed about the procedure from prior discussion with Dr. Morfin and from reading the provided literature.  We discussed the procedure at length including risks, anesthesia, intra-op procedures, recovery, bedrest, normal post-procedure expectations, and success rates.  I answered a few remaining questions. Mr. Alicea verbalized understanding and he is ready to proceed.

## 2024-09-10 DIAGNOSIS — I48.0 PAROXYSMAL ATRIAL FIBRILLATION: ICD-10-CM

## 2024-09-10 DIAGNOSIS — N28.9 RENAL INSUFFICIENCY: Primary | ICD-10-CM

## 2024-09-19 ENCOUNTER — PRE-ADMISSION TESTING (OUTPATIENT)
Dept: PREADMISSION TESTING | Facility: HOSPITAL | Age: 69
End: 2024-09-19
Payer: MEDICARE

## 2024-09-19 ENCOUNTER — HOSPITAL ENCOUNTER (OUTPATIENT)
Dept: CT IMAGING | Facility: HOSPITAL | Age: 69
Discharge: HOME | End: 2024-09-19
Payer: MEDICARE

## 2024-09-19 DIAGNOSIS — I48.0 PAROXYSMAL ATRIAL FIBRILLATION: ICD-10-CM

## 2024-09-19 LAB
ANION GAP SERPL CALCULATED.3IONS-SCNC: 14 MMOL/L (ref 5–15)
BUN SERPL-MCNC: 32 MG/DL (ref 8–23)
BUN/CREAT SERPL: 16 (ref 7–25)
CALCIUM SPEC-SCNC: 10.1 MG/DL (ref 8.6–10.5)
CHLORIDE SERPL-SCNC: 102 MMOL/L (ref 98–107)
CO2 SERPL-SCNC: 23 MMOL/L (ref 22–29)
CREAT SERPL-MCNC: 2 MG/DL (ref 0.76–1.27)
DEPRECATED RDW RBC AUTO: 62.6 FL (ref 37–54)
EGFRCR SERPLBLD CKD-EPI 2021: 35.5 ML/MIN/1.73
ERYTHROCYTE [DISTWIDTH] IN BLOOD BY AUTOMATED COUNT: 18.2 % (ref 12.3–15.4)
GLUCOSE SERPL-MCNC: 110 MG/DL (ref 65–99)
HBA1C MFR BLD: 5.1 % (ref 4.8–5.6)
HCT VFR BLD AUTO: 42.8 % (ref 37.5–51)
HGB BLD-MCNC: 14.8 G/DL (ref 13–17.7)
INR PPP: 1.03 (ref 0.89–1.12)
MCH RBC QN AUTO: 32.2 PG (ref 26.6–33)
MCHC RBC AUTO-ENTMCNC: 34.6 G/DL (ref 31.5–35.7)
MCV RBC AUTO: 93.2 FL (ref 79–97)
PLATELET # BLD AUTO: 171 10*3/MM3 (ref 140–450)
PMV BLD AUTO: 10.5 FL (ref 6–12)
POTASSIUM SERPL-SCNC: 4.3 MMOL/L (ref 3.5–5.2)
PROTHROMBIN TIME: 13.6 SECONDS (ref 12.2–14.5)
RBC # BLD AUTO: 4.59 10*6/MM3 (ref 4.14–5.8)
SODIUM SERPL-SCNC: 139 MMOL/L (ref 136–145)
WBC NRBC COR # BLD AUTO: 6.34 10*3/MM3 (ref 3.4–10.8)

## 2024-09-19 PROCEDURE — 71275 CT ANGIOGRAPHY CHEST: CPT

## 2024-09-19 PROCEDURE — 80048 BASIC METABOLIC PNL TOTAL CA: CPT

## 2024-09-19 PROCEDURE — 83036 HEMOGLOBIN GLYCOSYLATED A1C: CPT

## 2024-09-19 PROCEDURE — 85027 COMPLETE CBC AUTOMATED: CPT

## 2024-09-19 PROCEDURE — 36415 COLL VENOUS BLD VENIPUNCTURE: CPT

## 2024-09-19 PROCEDURE — 85610 PROTHROMBIN TIME: CPT

## 2024-09-19 PROCEDURE — 25510000001 IOPAMIDOL PER 1 ML: Performed by: INTERNAL MEDICINE

## 2024-09-19 RX ORDER — IOPAMIDOL 755 MG/ML
80 INJECTION, SOLUTION INTRAVASCULAR
Status: COMPLETED | OUTPATIENT
Start: 2024-09-19 | End: 2024-09-19

## 2024-09-19 RX ORDER — FERROUS GLUCONATE 324(38)MG
324 TABLET ORAL 2 TIMES DAILY
COMMUNITY

## 2024-09-19 RX ADMIN — IOPAMIDOL 80 ML: 755 INJECTION, SOLUTION INTRAVENOUS at 13:49

## 2024-09-20 ENCOUNTER — HOSPITAL ENCOUNTER (OUTPATIENT)
Facility: HOSPITAL | Age: 69
Discharge: HOME | End: 2024-09-20
Payer: MEDICARE

## 2024-09-20 VITALS — BODY MASS INDEX: 23.77 KG/M2 | WEIGHT: 166 LBS | HEIGHT: 70 IN

## 2024-09-20 DIAGNOSIS — I48.0 PAROXYSMAL ATRIAL FIBRILLATION: ICD-10-CM

## 2024-09-20 LAB
BH CV ECHO MEAS - AO MAX PG: 7.2 MMHG
BH CV ECHO MEAS - AO MEAN PG: 4 MMHG
BH CV ECHO MEAS - AO ROOT DIAM: 4.1 CM
BH CV ECHO MEAS - AO V2 MAX: 134.5 CM/SEC
BH CV ECHO MEAS - AO V2 VTI: 23.1 CM
BH CV ECHO MEAS - AVA(I,D): 2.05 CM2
BH CV ECHO MEAS - EDV(CUBED): 79.5 ML
BH CV ECHO MEAS - EDV(MOD-SP2): 49.8 ML
BH CV ECHO MEAS - EDV(MOD-SP4): 50 ML
BH CV ECHO MEAS - EF(MOD-BP): 63 %
BH CV ECHO MEAS - EF(MOD-SP2): 54.2 %
BH CV ECHO MEAS - EF(MOD-SP4): 68.6 %
BH CV ECHO MEAS - ESV(CUBED): 17.6 ML
BH CV ECHO MEAS - ESV(MOD-SP2): 22.8 ML
BH CV ECHO MEAS - ESV(MOD-SP4): 15.7 ML
BH CV ECHO MEAS - FS: 39.5 %
BH CV ECHO MEAS - IVS/LVPW: 1.1 CM
BH CV ECHO MEAS - IVSD: 1.1 CM
BH CV ECHO MEAS - LA DIMENSION: 3.4 CM
BH CV ECHO MEAS - LAT PEAK E' VEL: 9 CM/SEC
BH CV ECHO MEAS - LV DIASTOLIC VOL/BSA (35-75): 26 CM2
BH CV ECHO MEAS - LV MASS(C)D: 152.6 GRAMS
BH CV ECHO MEAS - LV MAX PG: 2.38 MMHG
BH CV ECHO MEAS - LV MEAN PG: 1.33 MMHG
BH CV ECHO MEAS - LV SYSTOLIC VOL/BSA (12-30): 8.2 CM2
BH CV ECHO MEAS - LV V1 MAX: 76.9 CM/SEC
BH CV ECHO MEAS - LV V1 VTI: 13.7 CM
BH CV ECHO MEAS - LVIDD: 4.3 CM
BH CV ECHO MEAS - LVIDS: 2.6 CM
BH CV ECHO MEAS - LVOT AREA: 3.5 CM2
BH CV ECHO MEAS - LVOT DIAM: 2.1 CM
BH CV ECHO MEAS - LVPWD: 1 CM
BH CV ECHO MEAS - MED PEAK E' VEL: 7.9 CM/SEC
BH CV ECHO MEAS - MV A MAX VEL: 97.4 CM/SEC
BH CV ECHO MEAS - MV DEC SLOPE: 858 CM/SEC2
BH CV ECHO MEAS - MV DEC TIME: 0.1 SEC
BH CV ECHO MEAS - MV E MAX VEL: 86.4 CM/SEC
BH CV ECHO MEAS - MV E/A: 0.89
BH CV ECHO MEAS - MV MAX PG: 2.9 MMHG
BH CV ECHO MEAS - MV MEAN PG: 2 MMHG
BH CV ECHO MEAS - MV V2 VTI: 14.3 CM
BH CV ECHO MEAS - MVA(VTI): 3.3 CM2
BH CV ECHO MEAS - PA ACC TIME: 0.09 SEC
BH CV ECHO MEAS - PA V2 MAX: 83 CM/SEC
BH CV ECHO MEAS - PI END-D VEL: 82.6 CM/SEC
BH CV ECHO MEAS - SV(LVOT): 47.3 ML
BH CV ECHO MEAS - SV(MOD-SP2): 27 ML
BH CV ECHO MEAS - SV(MOD-SP4): 34.3 ML
BH CV ECHO MEAS - SVI(LVOT): 24.6 ML/M2
BH CV ECHO MEAS - SVI(MOD-SP2): 14 ML/M2
BH CV ECHO MEAS - SVI(MOD-SP4): 17.8 ML/M2
BH CV ECHO MEAS - TAPSE (>1.6): 1.7 CM
BH CV ECHO MEASUREMENTS AVERAGE E/E' RATIO: 10.22
BH CV VAS BP LEFT ARM: NORMAL MMHG
BH CV XLRA - RV BASE: 3.1 CM
BH CV XLRA - RV LENGTH: 7 CM
BH CV XLRA - RV MID: 2.7 CM
BH CV XLRA - TDI S': 15.9 CM/SEC
IVRT: 109 MS
LEFT ATRIUM VOLUME INDEX: 22.4 ML/M2

## 2024-09-20 PROCEDURE — 93306 TTE W/DOPPLER COMPLETE: CPT

## 2024-09-20 PROCEDURE — 93306 TTE W/DOPPLER COMPLETE: CPT | Performed by: INTERNAL MEDICINE

## 2024-09-25 ENCOUNTER — ANESTHESIA EVENT (OUTPATIENT)
Dept: CARDIOLOGY | Facility: HOSPITAL | Age: 69
End: 2024-09-25
Payer: MEDICARE

## 2024-09-26 ENCOUNTER — HOSPITAL ENCOUNTER (OUTPATIENT)
Facility: HOSPITAL | Age: 69
Discharge: HOME | End: 2024-09-26
Attending: INTERNAL MEDICINE | Admitting: INTERNAL MEDICINE
Payer: MEDICARE

## 2024-09-26 ENCOUNTER — ANESTHESIA (OUTPATIENT)
Dept: CARDIOLOGY | Facility: HOSPITAL | Age: 69
End: 2024-09-26
Payer: MEDICARE

## 2024-09-26 VITALS
RESPIRATION RATE: 20 BRPM | OXYGEN SATURATION: 98 % | HEIGHT: 70 IN | HEART RATE: 80 BPM | BODY MASS INDEX: 22.68 KG/M2 | WEIGHT: 158.4 LBS | DIASTOLIC BLOOD PRESSURE: 99 MMHG | SYSTOLIC BLOOD PRESSURE: 146 MMHG | TEMPERATURE: 97.1 F

## 2024-09-26 DIAGNOSIS — I48.0 PAROXYSMAL ATRIAL FIBRILLATION: ICD-10-CM

## 2024-09-26 LAB
CREAT BLDA-MCNC: 1.8 MG/DL (ref 0.6–1.3)
GLUCOSE BLDC GLUCOMTR-MCNC: 80 MG/DL (ref 70–130)

## 2024-09-26 PROCEDURE — C1760 CLOSURE DEV, VASC: HCPCS | Performed by: INTERNAL MEDICINE

## 2024-09-26 PROCEDURE — 25810000003 SODIUM CHLORIDE 0.9 % SOLUTION: Performed by: NURSE ANESTHETIST, CERTIFIED REGISTERED

## 2024-09-26 PROCEDURE — 25010000002 PROPOFOL 10 MG/ML EMULSION: Performed by: NURSE ANESTHETIST, CERTIFIED REGISTERED

## 2024-09-26 PROCEDURE — C1733 CATH, EP, OTHR THAN COOL-TIP: HCPCS | Performed by: INTERNAL MEDICINE

## 2024-09-26 PROCEDURE — 93005 ELECTROCARDIOGRAM TRACING: CPT | Performed by: ANESTHESIOLOGY

## 2024-09-26 PROCEDURE — 25010000002 ONDANSETRON PER 1 MG: Performed by: NURSE ANESTHETIST, CERTIFIED REGISTERED

## 2024-09-26 PROCEDURE — 25010000002 SUGAMMADEX 200 MG/2ML SOLUTION: Performed by: NURSE ANESTHETIST, CERTIFIED REGISTERED

## 2024-09-26 PROCEDURE — C1730 CATH, EP, 19 OR FEW ELECT: HCPCS | Performed by: INTERNAL MEDICINE

## 2024-09-26 PROCEDURE — C1766 INTRO/SHEATH,STRBLE,NON-PEEL: HCPCS | Performed by: INTERNAL MEDICINE

## 2024-09-26 PROCEDURE — 25810000003 SODIUM CHLORIDE 0.9 % SOLUTION 250 ML FLEX CONT: Performed by: NURSE ANESTHETIST, CERTIFIED REGISTERED

## 2024-09-26 PROCEDURE — 25810000003 SODIUM CHLORIDE 0.9 % SOLUTION

## 2024-09-26 PROCEDURE — C1893 INTRO/SHEATH, FIXED,NON-PEEL: HCPCS | Performed by: INTERNAL MEDICINE

## 2024-09-26 PROCEDURE — 82948 REAGENT STRIP/BLOOD GLUCOSE: CPT

## 2024-09-26 PROCEDURE — C1732 CATH, EP, DIAG/ABL, 3D/VECT: HCPCS | Performed by: INTERNAL MEDICINE

## 2024-09-26 PROCEDURE — 25810000003 SODIUM CHLORIDE 0.9 % SOLUTION: Performed by: INTERNAL MEDICINE

## 2024-09-26 PROCEDURE — 25010000002 PROTAMINE SULFATE PER 10 MG: Performed by: INTERNAL MEDICINE

## 2024-09-26 PROCEDURE — C1894 INTRO/SHEATH, NON-LASER: HCPCS | Performed by: INTERNAL MEDICINE

## 2024-09-26 PROCEDURE — 82565 ASSAY OF CREATININE: CPT

## 2024-09-26 PROCEDURE — 25010000002 MIDAZOLAM PER 1 MG: Performed by: NURSE ANESTHETIST, CERTIFIED REGISTERED

## 2024-09-26 PROCEDURE — 25010000002 HEPARIN (PORCINE) PER 1000 UNITS: Performed by: INTERNAL MEDICINE

## 2024-09-26 PROCEDURE — C1759 CATH, INTRA ECHOCARDIOGRAPHY: HCPCS | Performed by: INTERNAL MEDICINE

## 2024-09-26 PROCEDURE — 25010000002 BUPIVACAINE 0.5 % SOLUTION: Performed by: INTERNAL MEDICINE

## 2024-09-26 PROCEDURE — 93623 PRGRMD STIMJ&PACG IV RX NFS: CPT | Performed by: INTERNAL MEDICINE

## 2024-09-26 PROCEDURE — 25810000003 LACTATED RINGERS PER 1000 ML: Performed by: ANESTHESIOLOGY

## 2024-09-26 PROCEDURE — 25010000002 PHENYLEPHRINE 10 MG/ML SOLUTION 1 ML VIAL: Performed by: NURSE ANESTHETIST, CERTIFIED REGISTERED

## 2024-09-26 PROCEDURE — 25010000002 HYDRALAZINE PER 20 MG

## 2024-09-26 PROCEDURE — 93656 COMPRE EP EVAL ABLTJ ATR FIB: CPT | Performed by: INTERNAL MEDICINE

## 2024-09-26 PROCEDURE — 25010000002 DEXAMETHASONE PER 1 MG: Performed by: NURSE ANESTHETIST, CERTIFIED REGISTERED

## 2024-09-26 RX ORDER — NITROGLYCERIN 0.4 MG/1
0.4 TABLET SUBLINGUAL
Status: DISCONTINUED | OUTPATIENT
Start: 2024-09-26 | End: 2024-09-26 | Stop reason: HOSPADM

## 2024-09-26 RX ORDER — ACETAMINOPHEN 325 MG/1
650 TABLET ORAL EVERY 4 HOURS PRN
Status: DISCONTINUED | OUTPATIENT
Start: 2024-09-26 | End: 2024-09-26 | Stop reason: HOSPADM

## 2024-09-26 RX ORDER — SODIUM CHLORIDE 9 MG/ML
INJECTION, SOLUTION INTRAVENOUS
Status: DISCONTINUED | OUTPATIENT
Start: 2024-09-26 | End: 2024-09-26 | Stop reason: HOSPADM

## 2024-09-26 RX ORDER — MIDAZOLAM HYDROCHLORIDE 1 MG/ML
INJECTION INTRAMUSCULAR; INTRAVENOUS AS NEEDED
Status: DISCONTINUED | OUTPATIENT
Start: 2024-09-26 | End: 2024-09-26 | Stop reason: SURG

## 2024-09-26 RX ORDER — ONDANSETRON 2 MG/ML
INJECTION INTRAMUSCULAR; INTRAVENOUS AS NEEDED
Status: DISCONTINUED | OUTPATIENT
Start: 2024-09-26 | End: 2024-09-26 | Stop reason: SURG

## 2024-09-26 RX ORDER — SODIUM CHLORIDE, SODIUM LACTATE, POTASSIUM CHLORIDE, CALCIUM CHLORIDE 600; 310; 30; 20 MG/100ML; MG/100ML; MG/100ML; MG/100ML
9 INJECTION, SOLUTION INTRAVENOUS CONTINUOUS
Status: DISCONTINUED | OUTPATIENT
Start: 2024-09-26 | End: 2024-09-26 | Stop reason: HOSPADM

## 2024-09-26 RX ORDER — LIDOCAINE HYDROCHLORIDE 5 MG/ML
INJECTION, SOLUTION INFILTRATION; PERINEURAL
Status: DISCONTINUED | OUTPATIENT
Start: 2024-09-26 | End: 2024-09-26 | Stop reason: HOSPADM

## 2024-09-26 RX ORDER — MIDAZOLAM HYDROCHLORIDE 1 MG/ML
0.5 INJECTION INTRAMUSCULAR; INTRAVENOUS
Status: DISCONTINUED | OUTPATIENT
Start: 2024-09-26 | End: 2024-09-26

## 2024-09-26 RX ORDER — FAMOTIDINE 20 MG/1
20 TABLET, FILM COATED ORAL ONCE
Status: DISCONTINUED | OUTPATIENT
Start: 2024-09-26 | End: 2024-09-26

## 2024-09-26 RX ORDER — AMLODIPINE BESYLATE 5 MG/1
5 TABLET ORAL DAILY
Qty: 30 TABLET | Refills: 2 | Status: SHIPPED | OUTPATIENT
Start: 2024-09-26

## 2024-09-26 RX ORDER — ROCURONIUM BROMIDE 10 MG/ML
INJECTION, SOLUTION INTRAVENOUS AS NEEDED
Status: DISCONTINUED | OUTPATIENT
Start: 2024-09-26 | End: 2024-09-26 | Stop reason: SURG

## 2024-09-26 RX ORDER — BUPIVACAINE HYDROCHLORIDE 5 MG/ML
INJECTION, SOLUTION PERINEURAL
Status: DISCONTINUED | OUTPATIENT
Start: 2024-09-26 | End: 2024-09-26 | Stop reason: HOSPADM

## 2024-09-26 RX ORDER — SODIUM CHLORIDE 9 MG/ML
1 INJECTION, SOLUTION INTRAVENOUS CONTINUOUS
Status: DISCONTINUED | OUTPATIENT
Start: 2024-09-26 | End: 2024-09-26

## 2024-09-26 RX ORDER — PROPOFOL 10 MG/ML
VIAL (ML) INTRAVENOUS AS NEEDED
Status: DISCONTINUED | OUTPATIENT
Start: 2024-09-26 | End: 2024-09-26 | Stop reason: SURG

## 2024-09-26 RX ORDER — FAMOTIDINE 10 MG/ML
20 INJECTION, SOLUTION INTRAVENOUS ONCE
Status: COMPLETED | OUTPATIENT
Start: 2024-09-26 | End: 2024-09-26

## 2024-09-26 RX ORDER — SODIUM CHLORIDE 0.9 % (FLUSH) 0.9 %
3 SYRINGE (ML) INJECTION EVERY 12 HOURS SCHEDULED
Status: DISCONTINUED | OUTPATIENT
Start: 2024-09-26 | End: 2024-09-26 | Stop reason: HOSPADM

## 2024-09-26 RX ORDER — EPHEDRINE SULFATE 50 MG/ML
INJECTION, SOLUTION INTRAVENOUS AS NEEDED
Status: DISCONTINUED | OUTPATIENT
Start: 2024-09-26 | End: 2024-09-26 | Stop reason: SURG

## 2024-09-26 RX ORDER — SODIUM CHLORIDE 9 MG/ML
INJECTION, SOLUTION INTRAVENOUS CONTINUOUS PRN
Status: DISCONTINUED | OUTPATIENT
Start: 2024-09-26 | End: 2024-09-26 | Stop reason: SURG

## 2024-09-26 RX ORDER — ONDANSETRON 2 MG/ML
4 INJECTION INTRAMUSCULAR; INTRAVENOUS EVERY 6 HOURS PRN
Status: DISCONTINUED | OUTPATIENT
Start: 2024-09-26 | End: 2024-09-26 | Stop reason: HOSPADM

## 2024-09-26 RX ORDER — PROTAMINE SULFATE 10 MG/ML
INJECTION, SOLUTION INTRAVENOUS
Status: DISCONTINUED | OUTPATIENT
Start: 2024-09-26 | End: 2024-09-26 | Stop reason: HOSPADM

## 2024-09-26 RX ORDER — SODIUM CHLORIDE 0.9 % (FLUSH) 0.9 %
10 SYRINGE (ML) INJECTION EVERY 12 HOURS SCHEDULED
Status: DISCONTINUED | OUTPATIENT
Start: 2024-09-26 | End: 2024-09-26

## 2024-09-26 RX ORDER — LIDOCAINE HYDROCHLORIDE 10 MG/ML
INJECTION, SOLUTION EPIDURAL; INFILTRATION; INTRACAUDAL; PERINEURAL AS NEEDED
Status: DISCONTINUED | OUTPATIENT
Start: 2024-09-26 | End: 2024-09-26 | Stop reason: SURG

## 2024-09-26 RX ORDER — SODIUM CHLORIDE 9 MG/ML
40 INJECTION, SOLUTION INTRAVENOUS AS NEEDED
Status: DISCONTINUED | OUTPATIENT
Start: 2024-09-26 | End: 2024-09-26

## 2024-09-26 RX ORDER — FENTANYL CITRATE 50 UG/ML
50 INJECTION, SOLUTION INTRAMUSCULAR; INTRAVENOUS
Status: DISCONTINUED | OUTPATIENT
Start: 2024-09-26 | End: 2024-09-26 | Stop reason: HOSPADM

## 2024-09-26 RX ORDER — ACETAMINOPHEN 650 MG/1
650 SUPPOSITORY RECTAL EVERY 4 HOURS PRN
Status: DISCONTINUED | OUTPATIENT
Start: 2024-09-26 | End: 2024-09-26 | Stop reason: HOSPADM

## 2024-09-26 RX ORDER — BUPIVACAINE HCL/0.9 % NACL/PF 0.125 %
PLASTIC BAG, INJECTION (ML) EPIDURAL AS NEEDED
Status: DISCONTINUED | OUTPATIENT
Start: 2024-09-26 | End: 2024-09-26 | Stop reason: SURG

## 2024-09-26 RX ORDER — HEPARIN SODIUM 1000 [USP'U]/ML
INJECTION, SOLUTION INTRAVENOUS; SUBCUTANEOUS
Status: DISCONTINUED | OUTPATIENT
Start: 2024-09-26 | End: 2024-09-26 | Stop reason: HOSPADM

## 2024-09-26 RX ORDER — HYDRALAZINE HYDROCHLORIDE 20 MG/ML
10 INJECTION INTRAMUSCULAR; INTRAVENOUS ONCE
Status: COMPLETED | OUTPATIENT
Start: 2024-09-26 | End: 2024-09-26

## 2024-09-26 RX ORDER — DEXAMETHASONE SODIUM PHOSPHATE 4 MG/ML
INJECTION, SOLUTION INTRA-ARTICULAR; INTRALESIONAL; INTRAMUSCULAR; INTRAVENOUS; SOFT TISSUE AS NEEDED
Status: DISCONTINUED | OUTPATIENT
Start: 2024-09-26 | End: 2024-09-26 | Stop reason: SURG

## 2024-09-26 RX ORDER — LIDOCAINE HYDROCHLORIDE 10 MG/ML
0.5 INJECTION, SOLUTION EPIDURAL; INFILTRATION; INTRACAUDAL; PERINEURAL ONCE AS NEEDED
Status: DISCONTINUED | OUTPATIENT
Start: 2024-09-26 | End: 2024-09-26

## 2024-09-26 RX ORDER — PANTOPRAZOLE SODIUM 40 MG/1
40 TABLET, DELAYED RELEASE ORAL DAILY
Qty: 30 TABLET | Refills: 0 | Status: SHIPPED | OUTPATIENT
Start: 2024-09-26 | End: 2024-09-26 | Stop reason: HOSPADM

## 2024-09-26 RX ORDER — SODIUM CHLORIDE 9 MG/ML
40 INJECTION, SOLUTION INTRAVENOUS AS NEEDED
Status: DISCONTINUED | OUTPATIENT
Start: 2024-09-26 | End: 2024-09-26 | Stop reason: HOSPADM

## 2024-09-26 RX ORDER — SODIUM CHLORIDE 0.9 % (FLUSH) 0.9 %
10 SYRINGE (ML) INJECTION AS NEEDED
Status: DISCONTINUED | OUTPATIENT
Start: 2024-09-26 | End: 2024-09-26

## 2024-09-26 RX ORDER — SODIUM CHLORIDE 0.9 % (FLUSH) 0.9 %
10 SYRINGE (ML) INJECTION AS NEEDED
Status: DISCONTINUED | OUTPATIENT
Start: 2024-09-26 | End: 2024-09-26 | Stop reason: HOSPADM

## 2024-09-26 RX ADMIN — ATROPINE SULFATE 0.5 MG: 0.4 INJECTION, SOLUTION INTRAVENOUS at 09:51

## 2024-09-26 RX ADMIN — Medication 160 MCG: at 08:29

## 2024-09-26 RX ADMIN — LIDOCAINE HYDROCHLORIDE 50 MG: 10 INJECTION, SOLUTION EPIDURAL; INFILTRATION; INTRACAUDAL; PERINEURAL at 08:22

## 2024-09-26 RX ADMIN — MIDAZOLAM HYDROCHLORIDE 2 MG: 1 INJECTION, SOLUTION INTRAMUSCULAR; INTRAVENOUS at 10:22

## 2024-09-26 RX ADMIN — SUGAMMADEX 300 MG: 100 INJECTION, SOLUTION INTRAVENOUS at 10:16

## 2024-09-26 RX ADMIN — DEXAMETHASONE SODIUM PHOSPHATE 8 MG: 4 INJECTION, SOLUTION INTRAMUSCULAR; INTRAVENOUS at 08:26

## 2024-09-26 RX ADMIN — Medication 80 MCG: at 08:32

## 2024-09-26 RX ADMIN — EPHEDRINE SULFATE 20 MG: 50 INJECTION INTRAVENOUS at 08:40

## 2024-09-26 RX ADMIN — HYDRALAZINE HYDROCHLORIDE 10 MG: 20 INJECTION INTRAMUSCULAR; INTRAVENOUS at 13:20

## 2024-09-26 RX ADMIN — SODIUM CHLORIDE: 9 INJECTION, SOLUTION INTRAVENOUS at 08:17

## 2024-09-26 RX ADMIN — ATROPINE SULFATE 0.4 MG: 0.4 INJECTION, SOLUTION INTRAVENOUS at 09:49

## 2024-09-26 RX ADMIN — SODIUM CHLORIDE 1 ML/KG/HR: 9 INJECTION, SOLUTION INTRAVENOUS at 07:57

## 2024-09-26 RX ADMIN — FAMOTIDINE 20 MG: 10 INJECTION, SOLUTION INTRAVENOUS at 07:56

## 2024-09-26 RX ADMIN — ROCURONIUM BROMIDE 100 MG: 10 SOLUTION INTRAVENOUS at 08:22

## 2024-09-26 RX ADMIN — PHENYLEPHRINE HYDROCHLORIDE 0.5 MCG/KG/MIN: 10 INJECTION INTRAVENOUS at 08:29

## 2024-09-26 RX ADMIN — PROPOFOL 150 MG: 10 INJECTION, EMULSION INTRAVENOUS at 08:22

## 2024-09-26 RX ADMIN — ONDANSETRON 4 MG: 2 INJECTION INTRAMUSCULAR; INTRAVENOUS at 08:26

## 2024-09-26 NOTE — Clinical Note
Hemostasis started on the right femoral vein. Vascade MVP was used in achieving hemostasis. Closure device deployed in the vessel. Hemostasis achieved successfully. Closure device additional comment: X2

## 2024-09-26 NOTE — H&P
Patient wore BiPAP 16/8 40% overnight, received scheduled Duoneb, breath sounds diminished bilaterally.    Ronda Fontana  October 17, 2017.5:39 AM         [facility]  HISTORY AND PHYSICAL    Blanco Alicea  : 1955  MRN:[id number]    Date of Admission:2024    PCP: Provider, No Known    Chief Complaint: Paroxysmal atrial fibrillation    PROBLEM LIST:   Paroxysmal Atrial Fibrillation   CHADSVasc = 2 on Eliquis  Onset , EKG shows atrial fibrillation  Echocardiogram 2015: mild LVH, EF 60%  Event Monitor 2024: Predominant NSR, average HR 76 bpm. No AFIB, less than 1% PAC/PVC  HTN  HLP  Renal Insufficiency Cr 1.63   Seasonal Allergies  GERD  Surgical History:  Tonsillectomy  L ankle fusion x 3     ALLERGIES:   Allergies   Allergen Reactions    Penicillins Other (See Comments)     Childhood         HOME MEDICINES:   Prior to Admission Medications       Prescriptions Last Dose Informant Patient Reported? Taking?    carvedilol (COREG) 6.25 MG tablet  Self, Medication Bottle No No    TAKE 1 TABLET TWICE A DAY    Eliquis 5 MG tablet tablet  Self, Medication Bottle No No    TAKE 1 TABLET EVERY 12 HOURS    Patient taking differently:  Take 1 tablet by mouth Every 12 (Twelve) Hours. Pt aware to hold morning of Ablation    esomeprazole (nexIUM) 20 MG capsule  Self, Medication Bottle Yes No    1 capsule Daily.    ferrous gluconate (FERGON) 324 MG tablet  Self, Medication Bottle Yes No    Take 1 tablet by mouth 2 (Two) Times a Day.    vitamin D (ERGOCALCIFEROL) 33549 units capsule capsule  Self, Medication Bottle Yes No    Take 1 capsule by mouth 1 (One) Time Per Week.             Subjective     HPI: Blanco Alicea is a 69 y.o. male with a past medical history significant for PAF, hypertension, hyperlipidemia, and renal insufficiency presents to [facility] today for pulmonary vein ablation with general anesthesia.  He was originally diagnosed with atrial fibrillation in .  For several years, he was controlled on beta-blocker but recently has been having more breakthrough episodes.  Patient is symptomatic  "when in atrial fibrillation with lightheadedness, dizziness, and fatigue.  He is anticoagulated on Eliquis and denies any bleeding complications or strokelike symptoms.  He denies any recent illnesses, fevers, or hospitalizations.    ROS: All systems have been reviewed and are negative with the exception of those mentioned in the HPI and problem list above.    Past Medical History:   Past Medical History:   Diagnosis Date    Atrial fibrillation 09/07/2016    Chronic kidney disease     GERD (gastroesophageal reflux disease) 09/07/2016    History of transfusion 2024    [facility], 1 unit, [facility] [place] [facility] 1 unit, no reactions    Hyperlipidemia 09/07/2016    Hypertension 09/07/2016    Seasonal allergies 09/07/2016      Surgical History:   Past Surgical History:   Procedure Laterality Date    ANKLE SURGERY      1 on right, 3 on left (both sides had fusion)    COLONOSCOPY      TONSILLECTOMY       Social History:   Social History     Socioeconomic History    Marital status:    Tobacco Use    Smoking status: Former     Types: Cigarettes     Passive exposure: Past    Smokeless tobacco: Never    Tobacco comments:     Smoked for a few years in college.     Vaping Use    Vaping status: Never Used   Substance and Sexual Activity    Alcohol use: Yes     Comment: occasional    Drug use: No    Sexual activity: Not Currently     Partners: Female     Family History:   Family History   Problem Relation Age of Onset    Stroke Mother     Stroke Father     Heart attack Father        Objective   /89 (BP Location: Left arm)   Pulse 68   Temp 96.9 °F (36.1 °C) (Temporal)   Resp 14   Ht 177.8 cm (70\")   Wt 71.8 kg (158 lb 6.4 oz)   SpO2 96%   BMI 22.73 kg/m²   No intake or output data in the 24 hours ending 09/26/24 0800    PHYSICAL EXAM:  CONSTITUTIONAL: Well nourished, cooperative, in no acute distress  HEENT: Normocephalic, atraumatic, PERRLA, no JVD  CARDIOVASCULAR:  Regular rhythm and normal rate, no " murmur, gallop, rub.   RESPIRATORY: Clear to auscultation, normal respiratory effort, no wheezing, rales or ronchi, on RA  EXTREMITIES: No gross deformities, no edema. Peripheral pulses are present and equal bilaterally  SKIN: Warm, dry. No bleeding, bruising or rash  NEUROLOGICAL: No focal deficits  PSYCHIATRIC: Normal mood and affect. Behavior is normal     Labs/Diagnostic Data  Results from last 7 days   Lab Units 09/19/24  1225   SODIUM mmol/L 139   POTASSIUM mmol/L 4.3   CHLORIDE mmol/L 102   CO2 mmol/L 23.0   BUN mg/dL 32*   CREATININE mg/dL 2.00*   GLUCOSE mg/dL 110*   CALCIUM mg/dL 10.1         Results from last 7 days   Lab Units 09/19/24  1225   WBC 10*3/mm3 6.34   HEMOGLOBIN g/dL 14.8   HEMATOCRIT % 42.8   PLATELETS 10*3/mm3 171                 Results from last 7 days   Lab Units 09/19/24  1225   HEMOGLOBIN A1C % 5.10         Results from last 7 days   Lab Units 09/19/24  1225   PROTIME Seconds 13.6   INR  1.03       EKG/Telemetry: NSR    Radiology Data:   No radiology results for the last day   Results for orders placed during the hospital encounter of 09/20/24    Adult Transthoracic Echo Complete w/ Color, Spectral and Contrast if necessary per protocol    Interpretation Summary    Left ventricular systolic function is normal. Left ventricular ejection fraction appears to be 61 - 65%.    Left ventricular diastolic function is consistent with (grade I) impaired relaxation.    Mild dilation of the aortic root is present. The aortic root measures 4.1 cm.       Current Medications:  famotidine, 20 mg, Oral, Once  sodium chloride, 10 mL, Intravenous, Q12H  sodium chloride, 3 mL, Intravenous, Q12H      lactated ringers, 9 mL/hr  sodium chloride, 1 mL/kg/hr (Order-Specific), Last Rate: 1 mL/kg/hr (09/26/24 0757)        Assessment:     Paroxysmal atrial fibrillation  [id number] 2, anticoagulated with Eliquis  Hypertension    Plan:     Patient is here today for pulmonary vein ablation with general anesthesia.   Procedure, risks, and alternatives have been discussed with the patient he is agreeable to proceed.  Further recommendations to follow.    Electronically signed by BENITO Buitrago, 09/26/24, 7:32 AM EDT.     Please note that portions of this note were dictated utilizing Dragon dictation.     I have read the above note and agree

## 2024-09-27 ENCOUNTER — CALL CENTER PROGRAMS (OUTPATIENT)
Dept: CALL CENTER | Facility: HOSPITAL | Age: 69
End: 2024-09-27
Payer: MEDICARE

## 2024-09-30 LAB
QT INTERVAL: 418 MS
QTC INTERVAL: 460 MS

## 2024-10-08 LAB
ACT BLD: 140 SECONDS (ref 82–152)
ACT BLD: 415 SECONDS (ref 82–152)
ACT BLD: 422 SECONDS (ref 82–152)

## 2024-10-28 ENCOUNTER — OFFICE VISIT (OUTPATIENT)
Dept: CARDIOLOGY | Facility: HOSPITAL | Age: 69
End: 2024-10-28
Payer: MEDICARE

## 2024-10-28 VITALS
RESPIRATION RATE: 18 BRPM | HEART RATE: 72 BPM | TEMPERATURE: 96.6 F | DIASTOLIC BLOOD PRESSURE: 71 MMHG | SYSTOLIC BLOOD PRESSURE: 142 MMHG | BODY MASS INDEX: 22.82 KG/M2 | OXYGEN SATURATION: 98 % | WEIGHT: 159.4 LBS | HEIGHT: 70 IN

## 2024-10-28 DIAGNOSIS — I48.0 PAROXYSMAL ATRIAL FIBRILLATION: Primary | ICD-10-CM

## 2024-10-28 DIAGNOSIS — I10 PRIMARY HYPERTENSION: ICD-10-CM

## 2024-10-28 NOTE — PROGRESS NOTES
"Chief Complaint  Post PVA    Subjective      History of Present Illness {CC  Problem List  Visit  Diagnosis   Encounters  Notes  Medications  Labs  Result Review Imaging  Media :23}     Blanco Alicea, 69 y.o. male presents to [facility] for Post PVA.    PROBLEM LIST:   Paroxysmal Atrial Fibrillation   CHADSVasc = 2 on Eliquis  Onset 2015, EKG shows atrial fibrillation  Echocardiogram 11/2015: mild LVH, EF 60%  Event Monitor 1/2024: Predominant NSR, average HR 76 bpm. No AFIB, less than 1% PAC/PVC  Successful pulsed field ablation (PFA) with isolation of the pulmonary veins and left atrial roof/posterior wall by Dr. Morfin on 9/26/2024.  HTN  HLP  Renal Insufficiency Cr 1.63   Seasonal Allergies  GERD  Surgical History:  Tonsillectomy  L ankle fusion x 3     HPI:  Patient recently underwent successful pulsed field ablation (PFA) with isolation of the pulmonary veins and left atrial roof/posterior wall by Dr. Morfin on 9/26/2024. Patient was instructed to continue uninterrupted anticoagulation.      Patient presents today doing well since their procedure.  States recurrence of arrhythmia symptoms. Denies access site complications, or signs/symptoms of bleeding.  Denies chest pain, dyspnea, palpitations, lightheadedness, and syncope.  They have continued uninterrupted anticoagulation as instructed. SBP generally less than 130 on home monitoring.       Objective     Vital Signs:   Vitals:    10/28/24 1245 10/28/24 1246   BP: 139/81 142/71   BP Location: Left arm Left arm   Patient Position: Standing Sitting   Cuff Size: Adult Adult   Pulse: 70 72   Resp:  18   Temp:  96.6 °F (35.9 °C)   TempSrc:  Temporal   SpO2: 98% 98%   Weight:  72.3 kg (159 lb 6.4 oz)   Height:  177.8 cm (70\")     Body mass index is 22.87 kg/m².  Physical Exam  Vitals and nursing note reviewed.   Constitutional:       Appearance: Normal appearance.   HENT:      Head: Normocephalic.   Eyes:      Extraocular " Movements: Extraocular movements intact.   Neck:      Vascular: No carotid bruit.   Cardiovascular:      Rate and Rhythm: Normal rate and regular rhythm.      Pulses: Normal pulses.      Heart sounds: Normal heart sounds, S1 normal and S2 normal. No murmur heard.  Pulmonary:      Effort: Pulmonary effort is normal. No respiratory distress.      Breath sounds: Normal breath sounds.   Musculoskeletal:      Cervical back: Neck supple.      Right lower leg: No edema.      Left lower leg: No edema.   Skin:     General: Skin is warm and dry.   Neurological:      General: No focal deficit present.      Mental Status: He is alert.   Psychiatric:         Mood and Affect: Mood normal.         Behavior: Behavior normal.         Thought Content: Thought content normal.        Data Reviewed:{ Labs  Result Review  Imaging  Med Tab  Media :23}     Basic metabolic panel (09/19/2024 12:25)  CBC (No diff) (09/19/2024 12:25)  Hemoglobin A1c (09/19/2024 12:25)    ECG 12 Lead Pre-Op / Pre-Procedure (09/26/2024 08:03)  Adult Transthoracic Echo Complete w/ Color, Spectral and Contrast if necessary per protocol (09/20/2024 13:48)  EP/CRM Study (09/26/2024 10:17)      Assessment & Plan   Assessment and Plan {CC Problem List  Visit Diagnosis  ROS  Review (Popup)  Health Maintenance  Quality  BestPractice  Medications  SmartSets  SnapShot Encounters  Media :23}     1. Paroxysmal atrial fibrillation  -s/p successful pulsed field ablation (PFA) with isolation of the pulmonary veins and left atrial roof/posterior wall by Dr. Morfin on 9/26/2024.  -No recurrent arrhythmia symptoms after recent ablation  -Rate/rhythm at time of exam  -[id number]: 2  -Continue anticoagulation with apixaban 5 Mg every 12 hours  -Continue carvedilol 6.25 Mg twice daily  -Continue routine EP follow-up as scheduled    2. Primary hypertension  -Reasonable control today, well-controlled on home monitoring  -Continue carvedilol 6.25 Mg twice daily,  amlodipine 5 Mg daily as prescribed  -Routine ambulatory blood pressure monitoring      Follow Up {Instructions Charge Capture  Follow-up Communications :23}     Return if symptoms worsen or fail to improve.      Patient was given instructions and counseling regarding his condition or for health maintenance advice. Please see specific information pulled into the AVS if appropriate.  Patient was instructed to call the [facility] with any questions, concerns, or worsening symptoms.    Dictated Utilizing Dragon Dictation   Please note that portions of this note were completed with a voice recognition program.   Part of this note may be an electronic transcription/translation of spoken language to printed text using the Dragon Dictation System.

## 2025-01-06 ENCOUNTER — TELEPHONE (OUTPATIENT)
Dept: CARDIOLOGY | Facility: CLINIC | Age: 70
End: 2025-01-06
Payer: MEDICARE

## 2025-01-06 NOTE — TELEPHONE ENCOUNTER
Name: LovGeovanna larsonhenny WHEATLEY    Relationship: Self    Best Callback Number: [phone]     Incoming call to the Hub, requesting to  Reschedule their PVA appointment on 1-8-25.     Per Hub workflow, further review is needed before the task can be completed.    Result of Call: Please reach out to the patient to reschedule

## 2025-01-15 ENCOUNTER — OFFICE VISIT (OUTPATIENT)
Dept: CARDIOLOGY | Facility: CLINIC | Age: 70
End: 2025-01-15
Payer: MEDICARE

## 2025-01-15 VITALS
DIASTOLIC BLOOD PRESSURE: 74 MMHG | BODY MASS INDEX: 22.28 KG/M2 | WEIGHT: 155.6 LBS | HEART RATE: 88 BPM | OXYGEN SATURATION: 97 % | SYSTOLIC BLOOD PRESSURE: 104 MMHG | HEIGHT: 70 IN

## 2025-01-15 DIAGNOSIS — I48.0 PAROXYSMAL ATRIAL FIBRILLATION: Primary | ICD-10-CM

## 2025-01-15 DIAGNOSIS — I10 PRIMARY HYPERTENSION: ICD-10-CM

## 2025-01-15 PROCEDURE — 3074F SYST BP LT 130 MM HG: CPT | Performed by: INTERNAL MEDICINE

## 2025-01-15 PROCEDURE — 3078F DIAST BP <80 MM HG: CPT | Performed by: INTERNAL MEDICINE

## 2025-01-15 PROCEDURE — 99213 OFFICE O/P EST LOW 20 MIN: CPT | Performed by: INTERNAL MEDICINE

## 2025-01-15 PROCEDURE — 93000 ELECTROCARDIOGRAM COMPLETE: CPT | Performed by: INTERNAL MEDICINE

## 2025-01-15 RX ORDER — FAMOTIDINE 20 MG/1
20 TABLET, FILM COATED ORAL DAILY
COMMUNITY
Start: 2024-11-25 | End: 2025-11-25

## 2025-01-15 NOTE — PROGRESS NOTES
Blanco Alicea  1955  [phone]    01/15/2025    [facility] CARDIOLOGY     Referring Provider: No ref. provider found     Pranav Mason MD  [address]  [facility] [address]    Chief Complaint   Patient presents with    Paroxysmal atrial fibrillation       Problem List:   Paroxysmal Atrial Fibrillation   CHADSVasc = 2 on Eliquis  Onset 2015, EKG shows atrial fibrillation  Echocardiogram 11/2015: mild LVH, EF 60%  Event Monitor 1/2024: Predominant NSR, average HR 76 bpm. No AFIB, less than 1% PAC/PVC  PFA of the pulmonary veins and posterior wall 9/26/2024  HTN  HLP  Renal Insufficiency Cr 1.63   Seasonal Allergies  GERD  Surgical History:  Tonsillectomy  L ankle fusion x 3     Allergies  Allergies   Allergen Reactions    Penicillins Other (See Comments)     Childhood         Current Medications    Current Outpatient Medications:     amLODIPine (NORVASC) 5 MG tablet, Take 1 tablet by mouth Daily., Disp: 30 tablet, Rfl: 2    apixaban (Eliquis) 5 MG tablet tablet, Take 1 tablet by mouth Every 12 (Twelve) Hours. First dose tonite, Disp: 180 tablet, Rfl: 3    carvedilol (COREG) 6.25 MG tablet, TAKE 1 TABLET TWICE A DAY, Disp: 180 tablet, Rfl: 3    cholecalciferol (VITAMIN D3) 1.25 MG (81426 UT) capsule, Take 1 capsule by mouth Every 7 (Seven) Days., Disp: , Rfl:     esomeprazole (nexIUM) 20 MG capsule, 1 capsule Daily., Disp: , Rfl:     famotidine (PEPCID) 20 MG tablet, Take 1 tablet by mouth Daily., Disp: , Rfl:     ferrous gluconate (FERGON) 324 MG tablet, Take 1 tablet by mouth 2 (Two) Times a Day., Disp: , Rfl:     vitamin D (ERGOCALCIFEROL) 43703 units capsule capsule, Take 1 capsule by mouth 1 (One) Time Per Week. Fridays, Disp: , Rfl:     History of Present Illness     Pt presents for follow up of PAF/HTN. Since we last saw the pt, pt underwent pulmonary vein ablation approximately 3 months ago.  Since that time he has done very well.  Denies any denies AF episodes, SOB, CP,  "LH, and dizziness. Denies any hospitalizations, ER visits, bleeding, or TIA/CVA symptoms. Overall feels well.  Blood pressure well-controlled.        Vitals:    01/15/25 1422   BP: 104/74   BP Location: Left arm   Patient Position: Sitting   Cuff Size: Adult   Pulse: 88   SpO2: 97%   Weight: 70.6 kg (155 lb 9.6 oz)   Height: 177.8 cm (70\")     Body mass index is 22.33 kg/m².  PE:  General: NAD  Neck: no JVD, no carotid bruits, no TM  Heart RRR, NL S1, S2, S4 present, no rubs, murmurs  Lungs: CTA, no wheezes, rhonchi, or rales  Abd: soft, non-tender, NL BS  Ext: No musculoskeletal deformities, no edema, cyanosis, or clubbing  Psych: normal mood and affect    Diagnostic Data:        ECG 12 Lead    Date/Time: 1/15/2025 2:54 PM  Performed by: Eamon Morfin MD    Authorized by: Eamon Morfin MD  Comparison: compared with previous ECG from 3/27/2024  Similar to previous ECG  Rhythm: sinus rhythm  BPM: 80               1. Paroxysmal atrial fibrillation    2. Primary hypertension          Plan:    PAF status post pulmonary vein ablation with PFA approximate 3 months ago overall doing well clinically.  Hypertension: Presently well-controlled.  3.  Oral anticoagulation: Patient with Joselito Vasc Score 2.  Long discussion with the patient.  The patient would like to stay on Eliquis for now.    F/up in 3 months      "

## 2025-01-22 ENCOUNTER — LAB (OUTPATIENT)
Facility: HOSPITAL | Age: 70
End: 2025-01-22
Payer: MEDICARE

## 2025-01-22 ENCOUNTER — OFFICE VISIT (OUTPATIENT)
Dept: CARDIOLOGY | Facility: CLINIC | Age: 70
End: 2025-01-22
Payer: MEDICARE

## 2025-01-22 VITALS
BODY MASS INDEX: 22.03 KG/M2 | SYSTOLIC BLOOD PRESSURE: 128 MMHG | HEART RATE: 89 BPM | HEIGHT: 70 IN | DIASTOLIC BLOOD PRESSURE: 70 MMHG | WEIGHT: 153.9 LBS | OXYGEN SATURATION: 98 %

## 2025-01-22 DIAGNOSIS — E78.5 DYSLIPIDEMIA: Primary | ICD-10-CM

## 2025-01-22 DIAGNOSIS — I10 PRIMARY HYPERTENSION: ICD-10-CM

## 2025-01-22 DIAGNOSIS — I48.0 PAROXYSMAL ATRIAL FIBRILLATION: ICD-10-CM

## 2025-01-22 DIAGNOSIS — E78.5 DYSLIPIDEMIA: ICD-10-CM

## 2025-01-22 DIAGNOSIS — E78.2 MIXED HYPERLIPIDEMIA: ICD-10-CM

## 2025-01-22 LAB
CHOLEST SERPL-MCNC: 250 MG/DL (ref 0–200)
HDLC SERPL-MCNC: 66 MG/DL (ref 40–60)
LDLC SERPL CALC-MCNC: 171 MG/DL (ref 0–100)
LDLC/HDLC SERPL: 2.56 {RATIO}
TRIGL SERPL-MCNC: 75 MG/DL (ref 0–150)
VLDLC SERPL-MCNC: 13 MG/DL (ref 5–40)

## 2025-01-22 PROCEDURE — 80061 LIPID PANEL: CPT

## 2025-01-22 PROCEDURE — 36415 COLL VENOUS BLD VENIPUNCTURE: CPT

## 2025-01-22 NOTE — PROGRESS NOTES
[facility] Cardiology  Consultation H&P  Blanco Alicea  1955  [address]  [facility] [address]     VISIT DATE:  01/22/25    PCP: Pranav Mason MD  [address]  [facility] [address]    IDENTIFICATION: A 69 y.o.  male  retired   Son is the associate bugler for [employer]    PROBLEM LIST:   PAF  [id number] 2, on Eliquis  Onset 2015 9/24  echo mild + LVH EF 60%  9/24 PFA pulm veins/post wall GFT   Hypertension  Hyperlipidemia, on atorvastatin   8/16   11/16/2020   HDL 69 LDL 98  Renal insufficiency  11/2024 Cr 1.6  GERD  ankle pain-brace 2017  Surgical history: Tonsillectomy, L ankle fusion X3  [id number]    CC:  Chief Complaint   Patient presents with    PAF     1 year follow up       Allergies  Allergies   Allergen Reactions    Penicillins Other (See Comments)     Childhood         Current Medications    Current Outpatient Medications:     amLODIPine (NORVASC) 5 MG tablet, Take 1 tablet by mouth Daily., Disp: 30 tablet, Rfl: 2    apixaban (Eliquis) 5 MG tablet tablet, Take 1 tablet by mouth Every 12 (Twelve) Hours. First dose tonite, Disp: 180 tablet, Rfl: 3    carvedilol (COREG) 6.25 MG tablet, TAKE 1 TABLET TWICE A DAY, Disp: 180 tablet, Rfl: 3    esomeprazole (nexIUM) 20 MG capsule, 1 capsule Daily., Disp: , Rfl:     famotidine (PEPCID) 20 MG tablet, Take 1 tablet by mouth Daily., Disp: , Rfl:     ferrous gluconate (FERGON) 324 MG tablet, Take 1 tablet by mouth 2 (Two) Times a Day., Disp: , Rfl:     vitamin D (ERGOCALCIFEROL) 57762 units capsule capsule, Take 1 capsule by mouth 1 (One) Time Per Week. Fridays, Disp: , Rfl:      History of Present Illness   HPI  Blanco Alicea is a 69 y.o. year old male with the above mentioned PMH who presents for follow up .    No recent issues states he feels much better following his A-fib ablation    Vitals:    01/22/25 1322   BP: 128/70   BP Location: Right arm   Patient Position: Sitting  No care due was identified.  [facility] Embedded Care Due Messages. Reference number: [id number].   11/02/2023 5:27:00 PM CDT   "  Cuff Size: Adult   Pulse: 89   SpO2: 98%   Weight: 69.8 kg (153 lb 14.4 oz)   Height: 177.8 cm (70\")         PHYSICAL EXAMINATION:  Vitals and nursing note reviewed.   Constitutional:       General: Not in acute distress.     Appearance: Well-developed and not in distress.   Eyes:      Conjunctiva/sclera: Conjunctivae normal.   HENT:      Head: Normocephalic and atraumatic.      Right Ear: External ear normal.      Left Ear: External ear normal.      Nose: Nose normal.   Neck:      Thyroid: No thyromegaly.      Vascular: No carotid bruit or JVD.   Pulmonary:      Effort: Pulmonary effort is normal. No respiratory distress.      Breath sounds: Normal breath sounds. No decreased breath sounds. No wheezing. No rhonchi. No rales.   Cardiovascular:      Normal rate. Regular rhythm. Normal S1. Normal S2.       Murmurs: There is no murmur.      No gallop.  No click. No rub.   Pulses:     No decreased pulses.   Abdominal:      General: Bowel sounds are normal.      Palpations: Abdomen is soft.      Tenderness: There is no abdominal tenderness.   Musculoskeletal: Normal range of motion.      Cervical back: Neck supple. Skin:     General: Skin is warm and dry.      Findings: No erythema.   Neurological:      Mental Status: Alert and oriented to person, place, and time.      Comments: No focal deficits.   Psychiatric:         Thought Content: Thought content normal.         Diagnostic Data:  Procedures    ASSESSMENT:   Diagnosis Plan   1. Dyslipidemia  Lipid Panel      2. Paroxysmal atrial fibrillation        3. Primary hypertension        4. Mixed hyperlipidemia                PLAN:  PAF post pva  Patient has acceptable BP. Continue current medical management. Counseled to regularly check BP at home with goal averaging <130/80.   Dyslipidemia off statin would recheck lipids at this time if LDL remains elevated would recommend cardiac calcium score        Pranav Mason MD, thank you for referring Mr. Alicea for " evaluation.  I have forwarded my electronically generated recommendations to you for review.  Please do not hesitate to call with any questions.     Grady Hamilton MD  1/22/2025  13:56 EST   Grady Hamilton MD, FACC

## 2025-01-24 ENCOUNTER — TELEPHONE (OUTPATIENT)
Dept: CARDIOLOGY | Facility: CLINIC | Age: 70
End: 2025-01-24
Payer: MEDICARE

## 2025-01-24 NOTE — TELEPHONE ENCOUNTER
----- Message from Grady Hamilton sent at 1/24/2025  8:56 AM EST -----     Rec cor calcium score  ----- Message -----  From: Lab, Background User  Sent: 1/22/2025  11:48 PM EST  To: Grady Hamilton MD    Pt called and informed of the above results, verbalized understanding.    Pt states he wants to check his budget prior to committing to the cardiac calcium score test . Will wait for call back prior to placing order.

## 2025-02-14 RX ORDER — CARVEDILOL 6.25 MG/1
6.25 TABLET ORAL 2 TIMES DAILY
Qty: 180 TABLET | Refills: 3 | Status: SHIPPED | OUTPATIENT
Start: 2025-02-14

## 2025-04-07 ENCOUNTER — TELEPHONE (OUTPATIENT)
Dept: CARDIOLOGY | Facility: CLINIC | Age: 70
End: 2025-04-07

## 2025-04-07 NOTE — TELEPHONE ENCOUNTER
Name: LovBlanco larson CORRY    Relationship: Self    Best Callback Number: [phone]     Incoming call to the Hub, requesting to  Reschedule their Other: PVA  appointment on 4-17-25.     Per [facility] workflow, further review is needed before the task can be completed.    Result of Call: Please reach out to the patient to reschedule

## 2025-04-08 ENCOUNTER — TELEPHONE (OUTPATIENT)
Dept: CARDIOLOGY | Facility: CLINIC | Age: 70
End: 2025-04-08

## 2025-04-17 ENCOUNTER — TELEPHONE (OUTPATIENT)
Dept: CARDIOLOGY | Facility: CLINIC | Age: 70
End: 2025-04-17

## 2025-04-17 NOTE — TELEPHONE ENCOUNTER
Name: Blanco Alicea    Relationship: Self    Best Callback Number: [phone]    Incoming call to the Hub, requesting to  Cancel their HFU appointment on 4.21.25.     Per Hub workflow, further review is needed before the task can be completed.    Result of Call: Please cancel this appointment

## 2025-05-06 ENCOUNTER — TELEPHONE (OUTPATIENT)
Dept: CARDIOLOGY | Facility: CLINIC | Age: 70
End: 2025-05-06
Payer: MEDICARE

## 2025-07-17 ENCOUNTER — TELEPHONE (OUTPATIENT)
Dept: CARDIOLOGY | Facility: CLINIC | Age: 70
End: 2025-07-17
Payer: MEDICARE

## 2025-07-17 NOTE — TELEPHONE ENCOUNTER
Caller: Blanco Alicea    Relationship: Self    Best call back number: [phone]     What is the best time to reach you: ANYTIME    Who are you requesting to speak with (clinical staff, provider,  specific staff member): CLINICAL       What was the call regarding:  PT IS WANTING TO KNOW IF HE NEEDS TO COME IN FOR HIS 6 MO FU FROM HIS HOSPITAL VISIT. HE WAS SUPPOSED TO BE SEEN ON 04/21/25, BUT HAD TO CANCEL DUE TO FAMILY.     PT IS SCHEDULED FOR AN APPOINTMENT ON 07/23/25, WANTS TO KNOW IF THIS IS NECESSARY. PT REPORTS HE HAS HAD ZERO EPISODES SINCE OCTOBER.     Is it okay if the provider responds through [facility]:  PLEASE CALL PT, THANK YOU

## 2025-07-21 NOTE — TELEPHONE ENCOUNTER
Called pt with recommendations per CHRISS Gamez    Yes because of Ablation he should have a 6 month and a one year visit if he can.           No answer, LVM.  Will also send a my chart message

## 2025-07-23 ENCOUNTER — OFFICE VISIT (OUTPATIENT)
Dept: CARDIOLOGY | Facility: CLINIC | Age: 70
End: 2025-07-23
Payer: MEDICARE

## 2025-07-23 VITALS
WEIGHT: 149 LBS | HEIGHT: 70 IN | HEART RATE: 67 BPM | OXYGEN SATURATION: 97 % | SYSTOLIC BLOOD PRESSURE: 138 MMHG | DIASTOLIC BLOOD PRESSURE: 82 MMHG | BODY MASS INDEX: 21.33 KG/M2

## 2025-07-23 DIAGNOSIS — I48.0 PAROXYSMAL ATRIAL FIBRILLATION: Primary | ICD-10-CM

## 2025-07-23 DIAGNOSIS — I10 PRIMARY HYPERTENSION: ICD-10-CM

## 2025-07-23 NOTE — PROGRESS NOTES
Blanco Alicea  1955  [phone]        [facility] CARDIOLOGY     Referring Provider: No ref. provider found     Talha Kimbrough MD  [address] [address]    Chief Complaint   Patient presents with    Dyslipidemia       Problem List:   Paroxysmal Atrial Fibrillation   CHADSVasc = 2 on Eliquis  Onset 2015, EKG shows atrial fibrillation  Echocardiogram 11/2015: mild LVH, EF 60%  Event Monitor 1/2024: Predominant NSR, average HR 76 bpm. No AFIB, less than 1% PAC/PVC  PFA of the pulmonary veins and posterior wall 9/26/2024  HTN  HLP  Renal Insufficiency Cr 1.63   Seasonal Allergies  GERD  Surgical History:  Tonsillectomy  L ankle fusion x 3     Allergies  Allergies   Allergen Reactions    Penicillins Other (See Comments)     Childhood         Current Medications    Current Outpatient Medications:     amLODIPine (NORVASC) 5 MG tablet, Take 1 tablet by mouth Daily., Disp: 30 tablet, Rfl: 2    apixaban (Eliquis) 5 MG tablet tablet, Take 1 tablet by mouth Every 12 (Twelve) Hours. First dose tonite, Disp: 180 tablet, Rfl: 3    carvedilol (COREG) 6.25 MG tablet, TAKE 1 TABLET TWICE A DAY, Disp: 180 tablet, Rfl: 3    esomeprazole (nexIUM) 20 MG capsule, 1 capsule Daily., Disp: , Rfl:     famotidine (PEPCID) 20 MG tablet, Take 1 tablet by mouth Daily., Disp: , Rfl:     ferrous gluconate (FERGON) 324 MG tablet, Take 1 tablet by mouth 2 (Two) Times a Day., Disp: , Rfl:     vitamin D (ERGOCALCIFEROL) 80258 units capsule capsule, Take 1 capsule by mouth 1 (One) Time Per Week. Fridays, Disp: , Rfl:     History of Present Illness     Pt presents for follow up of PAF/HTN. Since we last saw the pt, pt underwent pulmonary vein ablation approximately 6 months ago.  Since we last saw the pt, pt denies any AF episodes, SOB, CP, LH, and dizziness. Denies any hospitalizations, ER visits, bleeding, or TIA/CVA symptoms. Overall feels well. He is tolerating Eliquis treatment. He has been taking  "care of his wife who has breast cancer, she has had chemo and she has a good prognosis.     Vitals:    07/23/25 1423   BP: 138/82   BP Location: Left arm   Patient Position: Sitting   Cuff Size: Adult   Pulse: 67   SpO2: 97%   Weight: 67.6 kg (149 lb)   Height: 177.8 cm (70\")     Body mass index is 21.38 kg/m².  PE:  General: NAD  Neck: no JVD, no carotid bruits, no TM  Heart RRR, NL S1, S2, S4 present, no rubs, murmurs  Lungs: CTA, no wheezes, rhonchi, or rales  Abd: soft, non-tender, NL BS  Ext: No musculoskeletal deformities, no edema, cyanosis, or clubbing  Psych: normal mood and affect    Diagnostic Data:        ECG 12 Lead    Date/Time: 7/23/2025 2:52 PM  Performed by: Lars Askew PA    Authorized by: Lars Askew PA  Comparison: compared with previous ECG from 1/15/2025  Similar to previous ECG  Rhythm: sinus rhythm  Ectopy: unifocal PVCs  Rate: normal  Conduction: conduction normal  ST Segments: ST segments normal  QRS axis: normal    Clinical impression: abnormal EKG and non-specific ECG               1. Paroxysmal atrial fibrillation    2. Primary hypertension            Plan:    PAF status post pulmonary vein ablation with PFA 1/2025  Hypertension: Presently well-controlled.  3.  Oral anticoagulation: Patient with Joselito Vasc Score 2.  Eliquis RX. Tolerating well.   Follow up [place]   Electronically signed by CHRISS Hahn, 07/23/25, 2:51 PM EDT.       "

## 2025-08-11 DIAGNOSIS — I48.0 PAROXYSMAL ATRIAL FIBRILLATION: ICD-10-CM

## 2025-08-12 DIAGNOSIS — I48.0 PAROXYSMAL ATRIAL FIBRILLATION: ICD-10-CM

## (undated) DEVICE — LEX ELECTRO PHYSIOLOGY: Brand: MEDLINE INDUSTRIES, INC.

## (undated) DEVICE — ST EXT IV SMRTSTE 2VLV FIX M LL 6ML 41

## (undated) DEVICE — SYS CLS VASC/VENI VASCADE MVP 6TO12F

## (undated) DEVICE — PRESSURE MONITORING SET: Brand: TRUWAVE

## (undated) DEVICE — ACQGUIDE MINI-S, 65CM - L2 WITH ACQCROSS QX: Brand: ACQGUIDE MINI-S

## (undated) DEVICE — SET PRIMARY GRVTY 10DP MALE LL 104IN

## (undated) DEVICE — INTRO SHEATH ENGAGE W/50 GW .038 8F12

## (undated) DEVICE — CATHETER CONNECTION CABLE: Brand: FARASTAR™

## (undated) DEVICE — ADULT, W/LG. BACK PAD, RADIOTRANSPARENT ELEMENT AND LEAD WIRE COMPATIBLE W/: Brand: DEFIBRILLATION ELECTRODES

## (undated) DEVICE — ST INF PRI SMRTSTE 20DRP 2VLV 24ML 117

## (undated) DEVICE — Device: Brand: WEBSTER CS

## (undated) DEVICE — Device: Brand: REFERENCE PATCH CARTO 3

## (undated) DEVICE — TBG PRESS MON 48IN M/F L/L: Brand: MEDLINE INDUSTRIES, INC.

## (undated) DEVICE — INTRO SHEATH FAST/CATH LG/LUM 11F .038IN 12CM

## (undated) DEVICE — DOME MONITORING W BONDED STPCK BIOTRANS2

## (undated) DEVICE — Device: Brand: MEDEX

## (undated) DEVICE — DECANT BG O JET

## (undated) DEVICE — Device: Brand: PENTARAY NAV

## (undated) DEVICE — INTRO SHEATH ENGAGE W/50 GW .038 7F12

## (undated) DEVICE — PULSED FIELD ABLATION CATHETER: Brand: FARAWAVE™

## (undated) DEVICE — SYS CLS VASC/VENI VASCADE/MVP 10TO12F XL

## (undated) DEVICE — KT MANIFLD EP

## (undated) DEVICE — SOL NACL 0.9PCT 1000ML

## (undated) DEVICE — Device: Brand: SOUNDSTAR

## (undated) DEVICE — ST EXT IV SMARTSITE PINCH/CLMP 5ML 46CM

## (undated) DEVICE — STEERABLE SHEATH CLEAR: Brand: FARADRIVE™